# Patient Record
Sex: MALE | Race: WHITE | NOT HISPANIC OR LATINO
[De-identification: names, ages, dates, MRNs, and addresses within clinical notes are randomized per-mention and may not be internally consistent; named-entity substitution may affect disease eponyms.]

---

## 2023-04-14 PROBLEM — Z00.00 ENCOUNTER FOR PREVENTIVE HEALTH EXAMINATION: Status: ACTIVE | Noted: 2023-04-14

## 2023-04-17 ENCOUNTER — APPOINTMENT (OUTPATIENT)
Dept: VASCULAR SURGERY | Facility: CLINIC | Age: 78
End: 2023-04-17
Payer: MEDICAID

## 2023-04-17 VITALS — HEIGHT: 66 IN

## 2023-04-17 VITALS — WEIGHT: 169 LBS | BODY MASS INDEX: 27.28 KG/M2

## 2023-04-17 VITALS — DIASTOLIC BLOOD PRESSURE: 82 MMHG | SYSTOLIC BLOOD PRESSURE: 136 MMHG

## 2023-04-17 DIAGNOSIS — Z85.46 PERSONAL HISTORY OF MALIGNANT NEOPLASM OF PROSTATE: ICD-10-CM

## 2023-04-17 DIAGNOSIS — Z86.39 PERSONAL HISTORY OF OTHER ENDOCRINE, NUTRITIONAL AND METABOLIC DISEASE: ICD-10-CM

## 2023-04-17 PROCEDURE — 93880 EXTRACRANIAL BILAT STUDY: CPT

## 2023-04-17 PROCEDURE — 99204 OFFICE O/P NEW MOD 45 MIN: CPT

## 2023-04-17 NOTE — END OF VISIT
[FreeTextEntry3] : 77 M with asymptomatic severe L ICA stenosis, with ratio of 8. We will get the CD to review the images.\par I explained to his son and him about natural history of severe ICA stenosis and the surgical options. They expressed understanding about risk of perioperative stroke and cranial nerve injury. They want to proceed with L CEA.

## 2023-04-17 NOTE — ASSESSMENT
[FreeTextEntry1] : The patient is a 77-year-old male with asymptomatic carotid stenosis.  The patient has a high-grade left internal carotid artery stenosis in which I have discussed the risk  of a carotid endarterectomy including stroke and bleeding.  The patient is in agreement and he will be scheduled for a left carotid endarterectomy.  The patient presents with his son.  The patient will require cardiac medical clearance prior to the procedure.  He is scheduled for a left carotid endarterectomy on 5/5/23

## 2023-04-17 NOTE — HISTORY OF PRESENT ILLNESS
[FreeTextEntry1] : The patient is a 77-year-old gentleman who presented to his primary care physician complaining of some dizziness and lightheadedness.  The patient was sent for a carotid duplex that noted to have a high-grade left internal carotid artery stenosis.  The patient was then sent for a CTA a scan at [facility]ology services in [place] that showed a left ICA stenosis.  The patient denies amaurosis fugax or specific TIA-like symptoms.  He has a past medical history of hyperlipidemia and prostate cancer.  He is currently on a statin and aspirin regiment daily.

## 2023-04-17 NOTE — DATA REVIEWED
[FreeTextEntry1] : Performed a carotid duplex that shows a right internal carotid artery less than 50% stenosis in the left internal carotid artery greater than 70% stenosis with an ICA/CCA ratio of 8.1.

## 2023-04-24 ENCOUNTER — OUTPATIENT (OUTPATIENT)
Dept: OUTPATIENT SERVICES | Facility: HOSPITAL | Age: 78
LOS: 1 days | End: 2023-04-24
Payer: MEDICAID

## 2023-04-24 ENCOUNTER — RESULT REVIEW (OUTPATIENT)
Age: 78
End: 2023-04-24

## 2023-04-24 VITALS
HEIGHT: 66 IN | DIASTOLIC BLOOD PRESSURE: 84 MMHG | HEART RATE: 72 BPM | WEIGHT: 171.96 LBS | OXYGEN SATURATION: 97 % | SYSTOLIC BLOOD PRESSURE: 160 MMHG | RESPIRATION RATE: 16 BRPM | TEMPERATURE: 98 F

## 2023-04-24 DIAGNOSIS — Z01.818 ENCOUNTER FOR OTHER PREPROCEDURAL EXAMINATION: ICD-10-CM

## 2023-04-24 DIAGNOSIS — Z90.79 ACQUIRED ABSENCE OF OTHER GENITAL ORGAN(S): Chronic | ICD-10-CM

## 2023-04-24 DIAGNOSIS — I65.23 OCCLUSION AND STENOSIS OF BILATERAL CAROTID ARTERIES: ICD-10-CM

## 2023-04-24 LAB
ALBUMIN SERPL ELPH-MCNC: 4.4 G/DL — SIGNIFICANT CHANGE UP (ref 3.5–5.2)
ALP SERPL-CCNC: 82 U/L — SIGNIFICANT CHANGE UP (ref 30–115)
ALT FLD-CCNC: 12 U/L — SIGNIFICANT CHANGE UP (ref 0–41)
ANION GAP SERPL CALC-SCNC: 9 MMOL/L — SIGNIFICANT CHANGE UP (ref 7–14)
APTT BLD: 30.4 SEC — SIGNIFICANT CHANGE UP (ref 27–39.2)
AST SERPL-CCNC: 15 U/L — SIGNIFICANT CHANGE UP (ref 0–41)
BASOPHILS # BLD AUTO: 0.02 K/UL — SIGNIFICANT CHANGE UP (ref 0–0.2)
BASOPHILS NFR BLD AUTO: 0.6 % — SIGNIFICANT CHANGE UP (ref 0–1)
BILIRUB SERPL-MCNC: 0.5 MG/DL — SIGNIFICANT CHANGE UP (ref 0.2–1.2)
BLD GP AB SCN SERPL QL: SIGNIFICANT CHANGE UP
BUN SERPL-MCNC: 22 MG/DL — HIGH (ref 10–20)
CALCIUM SERPL-MCNC: 9.6 MG/DL — SIGNIFICANT CHANGE UP (ref 8.4–10.5)
CHLORIDE SERPL-SCNC: 107 MMOL/L — SIGNIFICANT CHANGE UP (ref 98–110)
CO2 SERPL-SCNC: 25 MMOL/L — SIGNIFICANT CHANGE UP (ref 17–32)
CREAT SERPL-MCNC: 1.1 MG/DL — SIGNIFICANT CHANGE UP (ref 0.7–1.5)
EGFR: 69 ML/MIN/1.73M2 — SIGNIFICANT CHANGE UP
EOSINOPHIL # BLD AUTO: 0.16 K/UL — SIGNIFICANT CHANGE UP (ref 0–0.7)
EOSINOPHIL NFR BLD AUTO: 4.8 % — SIGNIFICANT CHANGE UP (ref 0–8)
GLUCOSE SERPL-MCNC: 80 MG/DL — SIGNIFICANT CHANGE UP (ref 70–99)
HCT VFR BLD CALC: 40.4 % — LOW (ref 42–52)
HGB BLD-MCNC: 12.8 G/DL — LOW (ref 14–18)
IMM GRANULOCYTES NFR BLD AUTO: 0 % — LOW (ref 0.1–0.3)
INR BLD: 0.95 RATIO — SIGNIFICANT CHANGE UP (ref 0.65–1.3)
LYMPHOCYTES # BLD AUTO: 0.59 K/UL — LOW (ref 1.2–3.4)
LYMPHOCYTES # BLD AUTO: 17.8 % — LOW (ref 20.5–51.1)
MCHC RBC-ENTMCNC: 27.2 PG — SIGNIFICANT CHANGE UP (ref 27–31)
MCHC RBC-ENTMCNC: 31.7 G/DL — LOW (ref 32–37)
MCV RBC AUTO: 86 FL — SIGNIFICANT CHANGE UP (ref 80–94)
MONOCYTES # BLD AUTO: 0.24 K/UL — SIGNIFICANT CHANGE UP (ref 0.1–0.6)
MONOCYTES NFR BLD AUTO: 7.2 % — SIGNIFICANT CHANGE UP (ref 1.7–9.3)
NEUTROPHILS # BLD AUTO: 2.31 K/UL — SIGNIFICANT CHANGE UP (ref 1.4–6.5)
NEUTROPHILS NFR BLD AUTO: 69.6 % — SIGNIFICANT CHANGE UP (ref 42.2–75.2)
NRBC # BLD: 0 /100 WBCS — SIGNIFICANT CHANGE UP (ref 0–0)
PLATELET # BLD AUTO: 169 K/UL — SIGNIFICANT CHANGE UP (ref 130–400)
PMV BLD: 9.6 FL — SIGNIFICANT CHANGE UP (ref 7.4–10.4)
POTASSIUM SERPL-MCNC: 4.4 MMOL/L — SIGNIFICANT CHANGE UP (ref 3.5–5)
POTASSIUM SERPL-SCNC: 4.4 MMOL/L — SIGNIFICANT CHANGE UP (ref 3.5–5)
PROT SERPL-MCNC: 6.9 G/DL — SIGNIFICANT CHANGE UP (ref 6–8)
PROTHROM AB SERPL-ACNC: 10.8 SEC — SIGNIFICANT CHANGE UP (ref 9.95–12.87)
RBC # BLD: 4.7 M/UL — SIGNIFICANT CHANGE UP (ref 4.7–6.1)
RBC # FLD: 13.5 % — SIGNIFICANT CHANGE UP (ref 11.5–14.5)
SODIUM SERPL-SCNC: 141 MMOL/L — SIGNIFICANT CHANGE UP (ref 135–146)
WBC # BLD: 3.23 K/UL — LOW (ref 4.8–10.8)
WBC # FLD AUTO: 3.23 K/UL — LOW (ref 4.8–10.8)

## 2023-04-24 PROCEDURE — 93010 ELECTROCARDIOGRAM REPORT: CPT

## 2023-04-24 PROCEDURE — 71046 X-RAY EXAM CHEST 2 VIEWS: CPT

## 2023-04-24 PROCEDURE — 85730 THROMBOPLASTIN TIME PARTIAL: CPT

## 2023-04-24 PROCEDURE — 85610 PROTHROMBIN TIME: CPT

## 2023-04-24 PROCEDURE — 86900 BLOOD TYPING SEROLOGIC ABO: CPT

## 2023-04-24 PROCEDURE — 36415 COLL VENOUS BLD VENIPUNCTURE: CPT

## 2023-04-24 PROCEDURE — 93005 ELECTROCARDIOGRAM TRACING: CPT

## 2023-04-24 PROCEDURE — 86901 BLOOD TYPING SEROLOGIC RH(D): CPT

## 2023-04-24 PROCEDURE — 99214 OFFICE O/P EST MOD 30 MIN: CPT | Mod: 25

## 2023-04-24 PROCEDURE — 71046 X-RAY EXAM CHEST 2 VIEWS: CPT | Mod: 26

## 2023-04-24 PROCEDURE — 85025 COMPLETE CBC W/AUTO DIFF WBC: CPT

## 2023-04-24 PROCEDURE — 86850 RBC ANTIBODY SCREEN: CPT

## 2023-04-24 PROCEDURE — 80053 COMPREHEN METABOLIC PANEL: CPT

## 2023-04-24 NOTE — H&P PST ADULT - NSICDXPASTMEDICALHX_GEN_ALL_CORE_FT
PAST MEDICAL HISTORY:  Former smoker     H/O carotid stenosis     HTN (hypertension)     Prostate cancer     PVD (peripheral vascular disease)

## 2023-04-24 NOTE — H&P PST ADULT - ATTENDING COMMENTS
Patient is here for left CEA w EEG monitoring. Marked and consented in preop holding with use of Fijian  and son (Janak)  No change, TIA or stroke since office visit.

## 2023-04-24 NOTE — H&P PST ADULT - HISTORY OF PRESENT ILLNESS
Patient is a 77  year old  male presenting to [facility] in preparation for LEFT CAROTID ENDARTERECTOMY W/ PATCH & EEG MONITORING  on 5/5/23  under anesthesia by Dr. Rubio .  per son reports c/o ~ 2 yrs impaired short term memory& loss of balance/ dizziness when walking, "there is a blockage in the left side of his neck"    PATIENT CURRENTLY DENIES CHEST PAIN  SHORTNESS OF BREATH  PALPITATIONS,  DYSURIA, OR UPPER RESPIRATORY INFECTION IN PAST 2 WEEKS  EXERCISE  TOLERANCE 2 blocks  WITHOUT SHORTNESS OF BREATH  denies travel outside the [place] in the past 30 days  Patient denies any signs or symptoms of COVID 19 and denies contact with known positive individuals.  They have an appointment for repeat COVID testing pre-procedure and acknowledge its time and place.  They were instructed to quarantine pre-procedure, practice exposure control measures, continue to self-monitor and report any concerns to their proceduralist.  pt advised self quarantine till day of procedure  Anesthesia Alert  NO--Difficult Airway  NO--History of neck surgery or radiation  NO--Limited ROM of neck  NO--History of Malignant hyperthermia  NO--No personal or family history of Pseudocholinesterase deficiency.  NO--Prior Anesthesia Complication  NO--Latex Allergy  NO--Loose teeth  NO--History of Rheumatoid Arthritis  NO--Bleeding risk  NO--RASHAAD  NO--Other_____    PT DENIES ANY RASHES, ABRASION, OR OPEN WOUNDS OR CUTS    AS PER THE PT, THIS IS HIS/HER COMPLETE MEDICAL AND SURGICAL HX, INCLUDING MEDICATIONS PRESCRIBED AND OVER THE COUNTER    Patient verbalized understanding of instructions and was given the opportunity to ask questions and have them answered.    pt denies any suicidal ideation or thoughts, pt states not a threat to self or others

## 2023-04-25 DIAGNOSIS — Z01.818 ENCOUNTER FOR OTHER PREPROCEDURAL EXAMINATION: ICD-10-CM

## 2023-04-25 DIAGNOSIS — I65.23 OCCLUSION AND STENOSIS OF BILATERAL CAROTID ARTERIES: ICD-10-CM

## 2023-05-05 ENCOUNTER — RESULT REVIEW (OUTPATIENT)
Age: 78
End: 2023-05-05

## 2023-05-05 ENCOUNTER — APPOINTMENT (OUTPATIENT)
Dept: VASCULAR SURGERY | Facility: HOSPITAL | Age: 78
End: 2023-05-05

## 2023-05-05 ENCOUNTER — TRANSCRIPTION ENCOUNTER (OUTPATIENT)
Age: 78
End: 2023-05-05

## 2023-05-05 ENCOUNTER — INPATIENT (INPATIENT)
Facility: HOSPITAL | Age: 78
LOS: 0 days | Discharge: HOME | DRG: 24 | End: 2023-05-06
Attending: STUDENT IN AN ORGANIZED HEALTH CARE EDUCATION/TRAINING PROGRAM | Admitting: STUDENT IN AN ORGANIZED HEALTH CARE EDUCATION/TRAINING PROGRAM
Payer: MEDICAID

## 2023-05-05 VITALS
DIASTOLIC BLOOD PRESSURE: 84 MMHG | RESPIRATION RATE: 17 BRPM | WEIGHT: 171.96 LBS | OXYGEN SATURATION: 99 % | HEART RATE: 71 BPM | SYSTOLIC BLOOD PRESSURE: 172 MMHG | HEIGHT: 66 IN | TEMPERATURE: 98 F

## 2023-05-05 DIAGNOSIS — I65.23 OCCLUSION AND STENOSIS OF BILATERAL CAROTID ARTERIES: ICD-10-CM

## 2023-05-05 DIAGNOSIS — Z90.79 ACQUIRED ABSENCE OF OTHER GENITAL ORGAN(S): Chronic | ICD-10-CM

## 2023-05-05 LAB
ANION GAP SERPL CALC-SCNC: 10 MMOL/L — SIGNIFICANT CHANGE UP (ref 7–14)
ANION GAP SERPL CALC-SCNC: 11 MMOL/L — SIGNIFICANT CHANGE UP (ref 7–14)
BASOPHILS # BLD AUTO: 0.02 K/UL — SIGNIFICANT CHANGE UP (ref 0–0.2)
BASOPHILS NFR BLD AUTO: 0.4 % — SIGNIFICANT CHANGE UP (ref 0–1)
BUN SERPL-MCNC: 21 MG/DL — HIGH (ref 10–20)
BUN SERPL-MCNC: 28 MG/DL — HIGH (ref 10–20)
CALCIUM SERPL-MCNC: 8.4 MG/DL — SIGNIFICANT CHANGE UP (ref 8.4–10.5)
CALCIUM SERPL-MCNC: 9 MG/DL — SIGNIFICANT CHANGE UP (ref 8.4–10.4)
CHLORIDE SERPL-SCNC: 107 MMOL/L — SIGNIFICANT CHANGE UP (ref 98–110)
CHLORIDE SERPL-SCNC: 108 MMOL/L — SIGNIFICANT CHANGE UP (ref 98–110)
CK MB CFR SERPL CALC: 1.6 NG/ML — SIGNIFICANT CHANGE UP (ref 0.6–6.3)
CK SERPL-CCNC: 59 U/L — SIGNIFICANT CHANGE UP (ref 0–225)
CO2 SERPL-SCNC: 19 MMOL/L — SIGNIFICANT CHANGE UP (ref 17–32)
CO2 SERPL-SCNC: 20 MMOL/L — SIGNIFICANT CHANGE UP (ref 17–32)
CREAT SERPL-MCNC: 0.9 MG/DL — SIGNIFICANT CHANGE UP (ref 0.7–1.5)
CREAT SERPL-MCNC: 1.1 MG/DL — SIGNIFICANT CHANGE UP (ref 0.7–1.5)
EGFR: 69 ML/MIN/1.73M2 — SIGNIFICANT CHANGE UP
EGFR: 87 ML/MIN/1.73M2 — SIGNIFICANT CHANGE UP
EOSINOPHIL # BLD AUTO: 0.09 K/UL — SIGNIFICANT CHANGE UP (ref 0–0.7)
EOSINOPHIL NFR BLD AUTO: 1.8 % — SIGNIFICANT CHANGE UP (ref 0–8)
GLUCOSE SERPL-MCNC: 113 MG/DL — HIGH (ref 70–99)
GLUCOSE SERPL-MCNC: 118 MG/DL — HIGH (ref 70–99)
HCT VFR BLD CALC: 34.3 % — LOW (ref 42–52)
HCT VFR BLD CALC: 37.1 % — LOW (ref 42–52)
HGB BLD-MCNC: 11.4 G/DL — LOW (ref 14–18)
HGB BLD-MCNC: 12.2 G/DL — LOW (ref 14–18)
IMM GRANULOCYTES NFR BLD AUTO: 0.2 % — SIGNIFICANT CHANGE UP (ref 0.1–0.3)
LACTATE SERPL-SCNC: 0.7 MMOL/L — SIGNIFICANT CHANGE UP (ref 0.7–2)
LYMPHOCYTES # BLD AUTO: 0.63 K/UL — LOW (ref 1.2–3.4)
LYMPHOCYTES # BLD AUTO: 12.9 % — LOW (ref 20.5–51.1)
MAGNESIUM SERPL-MCNC: 1.7 MG/DL — LOW (ref 1.8–2.4)
MAGNESIUM SERPL-MCNC: 2 MG/DL — SIGNIFICANT CHANGE UP (ref 1.8–2.4)
MCHC RBC-ENTMCNC: 27.8 PG — SIGNIFICANT CHANGE UP (ref 27–31)
MCHC RBC-ENTMCNC: 28.4 PG — SIGNIFICANT CHANGE UP (ref 27–31)
MCHC RBC-ENTMCNC: 32.9 G/DL — SIGNIFICANT CHANGE UP (ref 32–37)
MCHC RBC-ENTMCNC: 33.2 G/DL — SIGNIFICANT CHANGE UP (ref 32–37)
MCV RBC AUTO: 84.5 FL — SIGNIFICANT CHANGE UP (ref 80–94)
MCV RBC AUTO: 85.3 FL — SIGNIFICANT CHANGE UP (ref 80–94)
MONOCYTES # BLD AUTO: 0.33 K/UL — SIGNIFICANT CHANGE UP (ref 0.1–0.6)
MONOCYTES NFR BLD AUTO: 6.8 % — SIGNIFICANT CHANGE UP (ref 1.7–9.3)
NEUTROPHILS # BLD AUTO: 3.8 K/UL — SIGNIFICANT CHANGE UP (ref 1.4–6.5)
NEUTROPHILS NFR BLD AUTO: 77.9 % — HIGH (ref 42.2–75.2)
NRBC # BLD: 0 /100 WBCS — SIGNIFICANT CHANGE UP (ref 0–0)
NRBC # BLD: 0 /100 WBCS — SIGNIFICANT CHANGE UP (ref 0–0)
PHOSPHATE SERPL-MCNC: 2.9 MG/DL — SIGNIFICANT CHANGE UP (ref 2.1–4.9)
PHOSPHATE SERPL-MCNC: 3.6 MG/DL — SIGNIFICANT CHANGE UP (ref 2.1–4.9)
PLATELET # BLD AUTO: 166 K/UL — SIGNIFICANT CHANGE UP (ref 130–400)
PLATELET # BLD AUTO: 167 K/UL — SIGNIFICANT CHANGE UP (ref 130–400)
PMV BLD: 9.1 FL — SIGNIFICANT CHANGE UP (ref 7.4–10.4)
PMV BLD: 9.4 FL — SIGNIFICANT CHANGE UP (ref 7.4–10.4)
POTASSIUM SERPL-MCNC: 4.2 MMOL/L — SIGNIFICANT CHANGE UP (ref 3.5–5)
POTASSIUM SERPL-MCNC: 4.3 MMOL/L — SIGNIFICANT CHANGE UP (ref 3.5–5)
POTASSIUM SERPL-SCNC: 4.2 MMOL/L — SIGNIFICANT CHANGE UP (ref 3.5–5)
POTASSIUM SERPL-SCNC: 4.3 MMOL/L — SIGNIFICANT CHANGE UP (ref 3.5–5)
RBC # BLD: 4.02 M/UL — LOW (ref 4.7–6.1)
RBC # BLD: 4.39 M/UL — LOW (ref 4.7–6.1)
RBC # FLD: 13.3 % — SIGNIFICANT CHANGE UP (ref 11.5–14.5)
RBC # FLD: 13.3 % — SIGNIFICANT CHANGE UP (ref 11.5–14.5)
SODIUM SERPL-SCNC: 137 MMOL/L — SIGNIFICANT CHANGE UP (ref 135–146)
SODIUM SERPL-SCNC: 138 MMOL/L — SIGNIFICANT CHANGE UP (ref 135–146)
TROPONIN T SERPL-MCNC: <0.01 NG/ML — SIGNIFICANT CHANGE UP
WBC # BLD: 4.68 K/UL — LOW (ref 4.8–10.8)
WBC # BLD: 4.88 K/UL — SIGNIFICANT CHANGE UP (ref 4.8–10.8)
WBC # FLD AUTO: 4.68 K/UL — LOW (ref 4.8–10.8)
WBC # FLD AUTO: 4.88 K/UL — SIGNIFICANT CHANGE UP (ref 4.8–10.8)

## 2023-05-05 PROCEDURE — 82553 CREATINE MB FRACTION: CPT

## 2023-05-05 PROCEDURE — 93010 ELECTROCARDIOGRAM REPORT: CPT

## 2023-05-05 PROCEDURE — 71045 X-RAY EXAM CHEST 1 VIEW: CPT

## 2023-05-05 PROCEDURE — 85027 COMPLETE CBC AUTOMATED: CPT

## 2023-05-05 PROCEDURE — 71045 X-RAY EXAM CHEST 1 VIEW: CPT | Mod: 26

## 2023-05-05 PROCEDURE — 84484 ASSAY OF TROPONIN QUANT: CPT

## 2023-05-05 PROCEDURE — 80048 BASIC METABOLIC PNL TOTAL CA: CPT

## 2023-05-05 PROCEDURE — 84100 ASSAY OF PHOSPHORUS: CPT

## 2023-05-05 PROCEDURE — 83605 ASSAY OF LACTIC ACID: CPT

## 2023-05-05 PROCEDURE — 82550 ASSAY OF CK (CPK): CPT

## 2023-05-05 PROCEDURE — 88304 TISSUE EXAM BY PATHOLOGIST: CPT | Mod: 26

## 2023-05-05 PROCEDURE — 83735 ASSAY OF MAGNESIUM: CPT

## 2023-05-05 PROCEDURE — 85025 COMPLETE CBC W/AUTO DIFF WBC: CPT

## 2023-05-05 PROCEDURE — 93005 ELECTROCARDIOGRAM TRACING: CPT

## 2023-05-05 PROCEDURE — C1768: CPT

## 2023-05-05 PROCEDURE — C1751: CPT

## 2023-05-05 PROCEDURE — 88304 TISSUE EXAM BY PATHOLOGIST: CPT

## 2023-05-05 PROCEDURE — 36415 COLL VENOUS BLD VENIPUNCTURE: CPT

## 2023-05-05 PROCEDURE — 35301 RECHANNELING OF ARTERY: CPT | Mod: GC

## 2023-05-05 PROCEDURE — C1889: CPT

## 2023-05-05 RX ORDER — NICARDIPINE HYDROCHLORIDE 30 MG/1
1 CAPSULE, EXTENDED RELEASE ORAL
Qty: 40 | Refills: 0 | Status: DISCONTINUED | OUTPATIENT
Start: 2023-05-05 | End: 2023-05-06

## 2023-05-05 RX ORDER — MEPERIDINE HYDROCHLORIDE 50 MG/ML
12.5 INJECTION INTRAMUSCULAR; INTRAVENOUS; SUBCUTANEOUS
Refills: 0 | Status: DISCONTINUED | OUTPATIENT
Start: 2023-05-05 | End: 2023-05-05

## 2023-05-05 RX ORDER — HYDRALAZINE HCL 50 MG
5 TABLET ORAL EVERY 6 HOURS
Refills: 0 | Status: DISCONTINUED | OUTPATIENT
Start: 2023-05-05 | End: 2023-05-05

## 2023-05-05 RX ORDER — MAGNESIUM SULFATE 500 MG/ML
1 VIAL (ML) INJECTION ONCE
Refills: 0 | Status: COMPLETED | OUTPATIENT
Start: 2023-05-05 | End: 2023-05-05

## 2023-05-05 RX ORDER — CHLORHEXIDINE GLUCONATE 213 G/1000ML
1 SOLUTION TOPICAL
Refills: 0 | Status: DISCONTINUED | OUTPATIENT
Start: 2023-05-05 | End: 2023-05-06

## 2023-05-05 RX ORDER — ASPIRIN/CALCIUM CARB/MAGNESIUM 324 MG
0 TABLET ORAL
Refills: 0 | DISCHARGE

## 2023-05-05 RX ORDER — ATORVASTATIN CALCIUM 80 MG/1
20 TABLET, FILM COATED ORAL AT BEDTIME
Refills: 0 | Status: DISCONTINUED | OUTPATIENT
Start: 2023-05-05 | End: 2023-05-06

## 2023-05-05 RX ORDER — MORPHINE SULFATE 50 MG/1
4 CAPSULE, EXTENDED RELEASE ORAL
Refills: 0 | Status: DISCONTINUED | OUTPATIENT
Start: 2023-05-05 | End: 2023-05-05

## 2023-05-05 RX ORDER — POLYETHYLENE GLYCOL 3350 17 G/17G
17 POWDER, FOR SOLUTION ORAL DAILY
Refills: 0 | Status: DISCONTINUED | OUTPATIENT
Start: 2023-05-05 | End: 2023-05-06

## 2023-05-05 RX ORDER — ROSUVASTATIN CALCIUM 5 MG/1
1 TABLET ORAL
Refills: 0 | DISCHARGE

## 2023-05-05 RX ORDER — SODIUM CHLORIDE 9 MG/ML
1000 INJECTION, SOLUTION INTRAVENOUS
Refills: 0 | Status: DISCONTINUED | OUTPATIENT
Start: 2023-05-05 | End: 2023-05-05

## 2023-05-05 RX ORDER — ONDANSETRON 8 MG/1
4 TABLET, FILM COATED ORAL ONCE
Refills: 0 | Status: DISCONTINUED | OUTPATIENT
Start: 2023-05-05 | End: 2023-05-05

## 2023-05-05 RX ORDER — MORPHINE SULFATE 50 MG/1
2 CAPSULE, EXTENDED RELEASE ORAL
Refills: 0 | Status: DISCONTINUED | OUTPATIENT
Start: 2023-05-05 | End: 2023-05-05

## 2023-05-05 RX ORDER — CHOLECALCIFEROL (VITAMIN D3) 125 MCG
1 CAPSULE ORAL
Refills: 0 | DISCHARGE

## 2023-05-05 RX ORDER — CEFAZOLIN SODIUM 1 G
2000 VIAL (EA) INJECTION EVERY 8 HOURS
Refills: 0 | Status: COMPLETED | OUTPATIENT
Start: 2023-05-05 | End: 2023-05-05

## 2023-05-05 RX ORDER — SENNA PLUS 8.6 MG/1
1 TABLET ORAL AT BEDTIME
Refills: 0 | Status: DISCONTINUED | OUTPATIENT
Start: 2023-05-05 | End: 2023-05-06

## 2023-05-05 RX ORDER — GABAPENTIN 400 MG/1
100 CAPSULE ORAL THREE TIMES A DAY
Refills: 0 | Status: DISCONTINUED | OUTPATIENT
Start: 2023-05-05 | End: 2023-05-06

## 2023-05-05 RX ORDER — ACETAMINOPHEN 500 MG
650 TABLET ORAL EVERY 6 HOURS
Refills: 0 | Status: DISCONTINUED | OUTPATIENT
Start: 2023-05-05 | End: 2023-05-06

## 2023-05-05 RX ORDER — PREGABALIN 225 MG/1
0 CAPSULE ORAL
Refills: 0 | DISCHARGE

## 2023-05-05 RX ADMIN — NICARDIPINE HYDROCHLORIDE 5 MG/HR: 30 CAPSULE, EXTENDED RELEASE ORAL at 17:46

## 2023-05-05 RX ADMIN — GABAPENTIN 100 MILLIGRAM(S): 400 CAPSULE ORAL at 13:53

## 2023-05-05 RX ADMIN — SENNA PLUS 1 TABLET(S): 8.6 TABLET ORAL at 21:50

## 2023-05-05 RX ADMIN — GABAPENTIN 100 MILLIGRAM(S): 400 CAPSULE ORAL at 21:50

## 2023-05-05 RX ADMIN — POLYETHYLENE GLYCOL 3350 17 GRAM(S): 17 POWDER, FOR SOLUTION ORAL at 21:50

## 2023-05-05 RX ADMIN — Medication 650 MILLIGRAM(S): at 23:20

## 2023-05-05 RX ADMIN — Medication 100 MILLIGRAM(S): at 16:35

## 2023-05-05 RX ADMIN — ATORVASTATIN CALCIUM 20 MILLIGRAM(S): 80 TABLET, FILM COATED ORAL at 21:50

## 2023-05-05 RX ADMIN — Medication 100 MILLIGRAM(S): at 21:50

## 2023-05-05 RX ADMIN — Medication 100 GRAM(S): at 13:52

## 2023-05-05 RX ADMIN — Medication 650 MILLIGRAM(S): at 17:46

## 2023-05-05 RX ADMIN — Medication 650 MILLIGRAM(S): at 18:51

## 2023-05-05 NOTE — DISCHARGE NOTE PROVIDER - HOSPITAL COURSE
77M presented for elective left CEA for high grade left carotid stenosis. Pt was seen in office for c/o ~ 2 yrs impaired short term memory& loss of balance/ dizziness when walking, "there is a blockage in the left side of his neck". 77y Male with a PMH of HTN, prostate CA s/p prostatectomy, PVD who presented to [facility] for left carotid endarterectomy. The patient is a poor historian, however according to preoperative records the patient had reportedly c/o 2 years of progressive symptoms of short term memory loss, loss balance, dizziness while walking.   The patient is now s/p Left Carotid Endarterectomy, uncomplicated intraoperative course however elevated BP in the OR requiring esmolol gtt, nitroglycerin push, and Labetalol push 20mg last at 10:30AM.   SICU consulted for hemodynamic monitoring and blood pressure control.  The patient has been weaned off of the nicardipine gtt in the SICU,  weaned off of NC, tolerating regular diet, OOB and ambulatory and hemodynamically stable. The patient is clear for discharge from a Vascular Surgery standpoint with home ASA and Plavix. Dressings may be removed tomorrow, steri strips to fall off on their own. The patient may follow up in [facility] for furhther mendical management with Dr. Wendi Best.

## 2023-05-05 NOTE — CONSULT NOTE ADULT - CRITICAL CARE ATTENDING COMMENT
Critical Care: [id number]   This patient has a high probability of sudden, clinically significant deterioration, which requires the highest level of physician preparedness to intervene urgently. I managed/supervised life or organ supporting interventions that required frequent physician assessment. I have reviewed and agree with note above. I devoted my full attention to the direct care of this patient for the period of time indicated, including reviewing relevant labs and imaging, discussing treatment plan with the SICU team, nurses, and primary team at the time of multidisciplinary rounds, and reviewing findings with patient and family. This does not include time devoted to teaching any trainees and to performing any procedures.    GONSALO SMITH 77y Male admitted to SICU s/p L CEA     Issues we are addressing today:    vascular checks per surgery  multimodal pain meds  optimize fluid status, home antihtn meds  wean O2 as possible  diet advance as tolerated  monitor Is &Os  periop abx  a-line for now  DVT Prophylaxis   Stress Gastritis Prophylaxis   PT/OT when safe    The above note is NOT written at the time of rounds and will reflect all changes throughout management of the patient for the day note is written for.    Olvin Santo MD, D.SARAH

## 2023-05-05 NOTE — CONSULT NOTE ADULT - SUBJECTIVE AND OBJECTIVE BOX
SICU Consultation Note  =====================================================  HPI: 77y Male with a PMH of HTN, prostate CA s/p prostatectomy, PVD who presented to [facility] for left carotid endarterectomy. The patient is a poor historian, however according to preoperative records the patient had reportedly c/o 2 years of progressive symptoms of short term memory loss, loss balance, dizziness while walking.     The patient is now s/p Left Carotid Endarterectomy, uncomplicated intraoperative course however elevated BP in the OR requiring esmolol gtt, nitroglycerin push, and Labetalol push 20mg last at 10:30AM.     SICU consulted for hemodynamic monitoring and blood pressure control.     Surgery Information  OR time:  2 hours 45 mins   EBL:    50cc      IV Fluids: 1.3L      Blood Products: None   UOP:          PAST MEDICAL & SURGICAL HISTORY:  Prostate cancer      PVD (peripheral vascular disease)      Former smoker      H/O carotid stenosis      HTN (hypertension)      H/O prostatectomy        Home Meds: Home Medications:  aspirin 81 mg oral tablet: orally once a day (05 May 2023 06:49)  Crestor 5 mg oral tablet: 1 orally once a day (05 May 2023 06:49)  D3-50 1250 mcg (50,000 intl units) oral capsule: 1 orally once a week (05 May 2023 06:49)    Allergies: Allergies    No Known Drug Allergies  bee stings (Short breath)    Intolerances      Soc:   Advanced Directives: Presumed Full Code     ROS:    REVIEW OF SYSTEMS    [ ] A ten-point review of systems was otherwise negative except as noted.  [ ] Due to altered mental status/intubation, subjective information were not able to be obtained from the patient. History was obtained, to the extent possible, from review of the chart and collateral sources of information.      CURRENT MEDICATIONS:   --------------------------------------------------------------------------------------  Neurologic Medications  meperidine     Injectable 12.5 milliGRAM(s) IV Push every 10 minutes PRN Shivering  morphine  - Injectable 4 milliGRAM(s) IV Push every 10 minutes PRN Severe Pain (7 - 10)  morphine  - Injectable 2 milliGRAM(s) IV Push every 10 minutes PRN Moderate Pain (4 - 6)  ondansetron Injectable 4 milliGRAM(s) IV Push once PRN Nausea and/or Vomiting    Respiratory Medications    Cardiovascular Medications    Gastrointestinal Medications  lactated ringers. 1000 milliLiter(s) IV Continuous <Continuous>    Genitourinary Medications    Hematologic/Oncologic Medications    Antimicrobial/Immunologic Medications  ceFAZolin   IVPB 2000 milliGRAM(s) IV Intermittent every 8 hours    Endocrine/Metabolic Medications  atorvastatin 20 milliGRAM(s) Oral at bedtime    Topical/Other Medications  chlorhexidine 4% Liquid 1 Application(s) Topical <User Schedule>    --------------------------------------------------------------------------------------    VITAL SIGNS, INS/OUTS (last 24 hours):  --------------------------------------------------------------------------------------  ICU Vital Signs Last 24 Hrs  T(C): 36.3 (05 May 2023 11:40), Max: 36.6 (05 May 2023 06:38)  T(F): 97.4 (05 May 2023 11:40), Max: 97.8 (05 May 2023 06:38)  HR: 64 (05 May 2023 12:25) (63 - 71)  BP: 143/67 (05 May 2023 12:25) (130/64 - 172/84)  BP(mean): --  ABP: 150/52 (05 May 2023 12:25) (130/66 - 152/56)  ABP(mean): --  RR: 19 (05 May 2023 12:25) (17 - 19)  SpO2: 98% (05 May 2023 12:25) (97% - 99%)    O2 Parameters below as of 05 May 2023 12:25  Patient On (Oxygen Delivery Method): nasal cannula  O2 Flow (L/min): 3        I&O's Summary    --------------------------------------------------------------------------------------    EXAM:  General/Neuro  Exam: Normal, NAD, alert, oriented x 3, no focal deficits. PERRLA      Respiratory  Exam: Lungs clear to auscultation, Normal expansion/effort, satting well on 3L NC    Cardiovascular  Exam: S1, S2.  Regular rate and rhythm.    GI  Exam: Abdomen soft, Non-tender, Non-distended. No guarding, rebound or rigidity.   Current Diet: CLD      Tubes/Lines/Drains  ***  [x] Peripheral IV  [x] Arterial Line		[] R	[x] L	[] Fem	[x] Rad	[] Ax	Date Placed: 5/5/23      Extremities  Exam: Extremities warm, pink, well-perfused.    Derm:  Exam: Good skin turgor, no skin breakdown.        LABS  --------------------------------------------------------------------------------------  Labs:  CAPILLARY BLOOD GLUCOSE                              11.4   4.88  )-----------( 167      ( 05 May 2023 12:20 )             34.3       Auto Neutrophil %: 77.9 % (05-05-23 @ 12:20)  Auto Immature Granulocyte %: 0.2 % (05-05-23 @ 12:20)        --------------------------------------------------------------------------------------      IMAGING RESULTS    < from: Xray Chest 2 Views PA/Lat (04.24.23 @ 09:52) >    No radiographic evidence of acute cardiopulmonary disease.    Findings suggest COPD.    < end of copied text >   SICU Consultation Note  =====================================================  HPI: 77y Male with a PMH of HTN, prostate CA s/p prostatectomy, PVD who presented to [facility] for left carotid endarterectomy. The patient is a poor historian, however according to preoperative records the patient had reportedly c/o 2 years of progressive symptoms of short term memory loss, loss balance, dizziness while walking.     The patient is now s/p Left Carotid Endarterectomy, uncomplicated intraoperative course however elevated BP in the OR requiring esmolol gtt, nitroglycerin push, and Labetalol push 20mg last at 10:30AM.     SICU consulted for hemodynamic monitoring and blood pressure control.     Surgery Information  OR time:  2 hours 45 mins   EBL:    50cc      IV Fluids: 1.3L      Blood Products: None   UOP:          PAST MEDICAL & SURGICAL HISTORY:  Prostate cancer      PVD (peripheral vascular disease)      Former smoker      H/O carotid stenosis      HTN (hypertension)      H/O prostatectomy        Home Meds: Home Medications:  aspirin 81 mg oral tablet: orally once a day (05 May 2023 06:49)  Crestor 5 mg oral tablet: 1 orally once a day (05 May 2023 06:49)  D3-50 1250 mcg (50,000 intl units) oral capsule: 1 orally once a week (05 May 2023 06:49)    Allergies: Allergies    No Known Drug Allergies  bee stings (Short breath)    Intolerances      Soc:   Advanced Directives: Presumed Full Code     ROS:    REVIEW OF SYSTEMS    [ ] A ten-point review of systems was otherwise negative except as noted.  [ ] Due to altered mental status/intubation, subjective information were not able to be obtained from the patient. History was obtained, to the extent possible, from review of the chart and collateral sources of information.      CURRENT MEDICATIONS:   --------------------------------------------------------------------------------------  Neurologic Medications  meperidine     Injectable 12.5 milliGRAM(s) IV Push every 10 minutes PRN Shivering  morphine  - Injectable 4 milliGRAM(s) IV Push every 10 minutes PRN Severe Pain (7 - 10)  morphine  - Injectable 2 milliGRAM(s) IV Push every 10 minutes PRN Moderate Pain (4 - 6)  ondansetron Injectable 4 milliGRAM(s) IV Push once PRN Nausea and/or Vomiting    Respiratory Medications    Cardiovascular Medications    Gastrointestinal Medications  lactated ringers. 1000 milliLiter(s) IV Continuous <Continuous>    Genitourinary Medications    Hematologic/Oncologic Medications    Antimicrobial/Immunologic Medications  ceFAZolin   IVPB 2000 milliGRAM(s) IV Intermittent every 8 hours    Endocrine/Metabolic Medications  atorvastatin 20 milliGRAM(s) Oral at bedtime    Topical/Other Medications  chlorhexidine 4% Liquid 1 Application(s) Topical <User Schedule>    --------------------------------------------------------------------------------------    VITAL SIGNS, INS/OUTS (last 24 hours):  --------------------------------------------------------------------------------------  ICU Vital Signs Last 24 Hrs  T(C): 36.3 (05 May 2023 11:40), Max: 36.6 (05 May 2023 06:38)  T(F): 97.4 (05 May 2023 11:40), Max: 97.8 (05 May 2023 06:38)  HR: 64 (05 May 2023 12:25) (63 - 71)  BP: 143/67 (05 May 2023 12:25) (130/64 - 172/84)  BP(mean): --  ABP: 150/52 (05 May 2023 12:25) (130/66 - 152/56)  ABP(mean): --  RR: 19 (05 May 2023 12:25) (17 - 19)  SpO2: 98% (05 May 2023 12:25) (97% - 99%)    O2 Parameters below as of 05 May 2023 12:25  Patient On (Oxygen Delivery Method): nasal cannula  O2 Flow (L/min): 3        I&O's Summary    --------------------------------------------------------------------------------------    EXAM:  General/Neuro  Exam: Normal, NAD, alert, oriented x 3, no focal deficits. PERRLA  . No neurological defitis.     Respiratory  Exam: Lungs clear to auscultation, Normal expansion/effort, satting well on 3L NC    Cardiovascular  Exam: S1, S2.  Regular rate and rhythm.    GI  Exam: Abdomen soft, Non-tender, Non-distended. No guarding, rebound or rigidity.   Current Diet: CLD      Tubes/Lines/Drains  ***  [x] Peripheral IV  [x] Arterial Line		[] R	[x] L	[] Fem	[x] Rad	[] Ax	Date Placed: 5/5/23      Extremities  Exam: Extremities warm, pink, well-perfused.    Derm:  Exam: Good skin turgor, no skin breakdown.        LABS  --------------------------------------------------------------------------------------  Labs:  CAPILLARY BLOOD GLUCOSE                              11.4   4.88  )-----------( 167      ( 05 May 2023 12:20 )             34.3       Auto Neutrophil %: 77.9 % (05-05-23 @ 12:20)  Auto Immature Granulocyte %: 0.2 % (05-05-23 @ 12:20)        --------------------------------------------------------------------------------------      IMAGING RESULTS    < from: Xray Chest 2 Views PA/Lat (04.24.23 @ 09:52) >    No radiographic evidence of acute cardiopulmonary disease.    Findings suggest COPD.    < end of copied text >   SICU Consultation Note  =====================================================  HPI: 77y Male with a PMH of HTN, prostate CA s/p prostatectomy, PVD who presented to [facility] for left carotid endarterectomy. The patient is a poor historian, however according to preoperative records the patient had reportedly c/o 2 years of progressive symptoms of short term memory loss, loss balance, dizziness while walking.     The patient is now s/p Left Carotid Endarterectomy, uncomplicated intraoperative course however elevated BP in the OR requiring esmolol gtt, nitroglycerin push, and Labetalol push 20mg last at 10:30AM.     SICU consulted for hemodynamic monitoring and blood pressure control.     Surgery Information  OR time:  2 hours 45 mins   EBL:    50cc      IV Fluids: 1.3L      Blood Products: None   UOP:  0cc        PAST MEDICAL & SURGICAL HISTORY:  Prostate cancer      PVD (peripheral vascular disease)      Former smoker      H/O carotid stenosis      HTN (hypertension)      H/O prostatectomy        Home Meds: Home Medications:  aspirin 81 mg oral tablet: orally once a day (05 May 2023 06:49)  Crestor 5 mg oral tablet: 1 orally once a day (05 May 2023 06:49)  D3-50 1250 mcg (50,000 intl units) oral capsule: 1 orally once a week (05 May 2023 06:49)    Allergies: Allergies    No Known Drug Allergies  bee stings (Short breath)    Intolerances      Soc:   Advanced Directives: Presumed Full Code     ROS:    REVIEW OF SYSTEMS    [ ] A ten-point review of systems was otherwise negative except as noted.  [ ] Due to altered mental status/intubation, subjective information were not able to be obtained from the patient. History was obtained, to the extent possible, from review of the chart and collateral sources of information.      CURRENT MEDICATIONS:   --------------------------------------------------------------------------------------  Neurologic Medications  meperidine     Injectable 12.5 milliGRAM(s) IV Push every 10 minutes PRN Shivering  morphine  - Injectable 4 milliGRAM(s) IV Push every 10 minutes PRN Severe Pain (7 - 10)  morphine  - Injectable 2 milliGRAM(s) IV Push every 10 minutes PRN Moderate Pain (4 - 6)  ondansetron Injectable 4 milliGRAM(s) IV Push once PRN Nausea and/or Vomiting    Respiratory Medications    Cardiovascular Medications    Gastrointestinal Medications  lactated ringers. 1000 milliLiter(s) IV Continuous <Continuous>    Genitourinary Medications    Hematologic/Oncologic Medications    Antimicrobial/Immunologic Medications  ceFAZolin   IVPB 2000 milliGRAM(s) IV Intermittent every 8 hours    Endocrine/Metabolic Medications  atorvastatin 20 milliGRAM(s) Oral at bedtime    Topical/Other Medications  chlorhexidine 4% Liquid 1 Application(s) Topical <User Schedule>    --------------------------------------------------------------------------------------    VITAL SIGNS, INS/OUTS (last 24 hours):  --------------------------------------------------------------------------------------  ICU Vital Signs Last 24 Hrs  T(C): 36.3 (05 May 2023 11:40), Max: 36.6 (05 May 2023 06:38)  T(F): 97.4 (05 May 2023 11:40), Max: 97.8 (05 May 2023 06:38)  HR: 64 (05 May 2023 12:25) (63 - 71)  BP: 143/67 (05 May 2023 12:25) (130/64 - 172/84)  BP(mean): --  ABP: 150/52 (05 May 2023 12:25) (130/66 - 152/56)  ABP(mean): --  RR: 19 (05 May 2023 12:25) (17 - 19)  SpO2: 98% (05 May 2023 12:25) (97% - 99%)    O2 Parameters below as of 05 May 2023 12:25  Patient On (Oxygen Delivery Method): nasal cannula  O2 Flow (L/min): 3        I&O's Summary    --------------------------------------------------------------------------------------    EXAM:  General/Neuro  Exam: Normal, NAD, alert, oriented x 3, no focal deficits. PERRLA  . No neurological defitis.     Respiratory  Exam: Lungs clear to auscultation, Normal expansion/effort, satting well on 3L NC    Cardiovascular  Exam: S1, S2.  Regular rate and rhythm.    GI  Exam: Abdomen soft, Non-tender, Non-distended. No guarding, rebound or rigidity.   Current Diet: CLD      Tubes/Lines/Drains   [x] Peripheral IV  [x] Arterial Line		[] R	[x] L	[] Fem	[x] Rad	[] Ax	Date Placed: 5/5/23      Extremities  Exam: Extremities warm, pink, well-perfused.    Derm:  Exam: Good skin turgor, no skin breakdown.  Incision site left neck clean, dry and intact      LABS  --------------------------------------------------------------------------------------  Labs:  CAPILLARY BLOOD GLUCOSE                              11.4   4.88  )-----------( 167      ( 05 May 2023 12:20 )             34.3       Auto Neutrophil %: 77.9 % (05-05-23 @ 12:20)  Auto Immature Granulocyte %: 0.2 % (05-05-23 @ 12:20)        --------------------------------------------------------------------------------------      IMAGING RESULTS    < from: Xray Chest 2 Views PA/Lat (04.24.23 @ 09:52) >    No radiographic evidence of acute cardiopulmonary disease.    Findings suggest COPD.    < end of copied text >

## 2023-05-05 NOTE — DISCHARGE NOTE PROVIDER - NSDCCPCAREPLAN_GEN_ALL_CORE_FT
PRINCIPAL DISCHARGE DIAGNOSIS  Diagnosis: Carotid stenosis, left  Assessment and Plan of Treatment: s/p thromboembolectomy and endarterectomy. Please, go to ED with any pain at the neck, swelling, difficulty swallowing, difficulty breathing, bleeding, oozing, numbness, weakness, syncope, fevers, chills. You may remove dressing next day. You may shower. Keep sterri strips on. Do not rub the site. Pat dry and keep open to air. Call your surgeon with any questions or concerns.

## 2023-05-05 NOTE — DISCHARGE NOTE PROVIDER - CARE PROVIDERS DIRECT ADDRESSES
,DirectAddress_Unknown,[email or web address] ,DirectAddress_Unknown,[email or web address]

## 2023-05-05 NOTE — ASU PREOP CHECKLIST - NS PREOP CHK MONITOR ANESTHESIA CONSENT
done [Lower Ext Edema] : lower extremity edema [Negative] : Heme/Lymph [Dyspnea on exertion] : not dyspnea during exertion [Shortness Of Breath] : no shortness of breath [Chest Pain] : no chest pain [Palpitations] : no palpitations

## 2023-05-05 NOTE — DISCHARGE NOTE PROVIDER - NSDCMRMEDTOKEN_GEN_ALL_CORE_FT
aspirin 81 mg oral tablet: orally once a day  Crestor 5 mg oral tablet: 1 orally once a day  D3-50 1250 mcg (50,000 intl units) oral capsule: 1 orally once a week

## 2023-05-05 NOTE — DISCHARGE NOTE PROVIDER - PROVIDER TOKENS
PROVIDER:[TOKEN:[[id number]],FOLLOWUP:[2 weeks]],PROVIDER:[TOKEN:[[id number]],FOLLOWUP:[1 week]] PROVIDER:[TOKEN:[[id number]],FOLLOWUP:[2 weeks]],PROVIDER:[TOKEN:[[id number]],FOLLOWUP:[1 week]],PROVIDER:[TOKEN:[[id number]]]

## 2023-05-05 NOTE — DISCHARGE NOTE PROVIDER - NSDCFUADDAPPT_GEN_ALL_CORE_FT
Please, make an appointment to see a cardiologist Dr. 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 Please, make an appointment to see a cardiologist Dr. 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    Please make appointment with Dr. Best at [facility] for medical management of HTN outpatient.

## 2023-05-05 NOTE — BRIEF OPERATIVE NOTE - OPERATION/FINDINGS
preop; left carotid stenosis   operation; left carotid endarterectomy with patch and eeg monitoring left carotid thromboembolectomy with patch and EEG monitoring.  Grossly, the L ICA lesion seemed like a missed embolus, which was partially organized, causing >90% luminal narrowing.  We will refer the patient to cardiology for FU (he is most likely a case of paroxysmal A fib?)

## 2023-05-05 NOTE — CONSULT NOTE ADULT - ASSESSMENT
Assessment & Plan    77y Male  s/p left carotid endarterectomy    NEURO:  #Acute pain    -APAP prn, Gabapentin 100mg q8  #2 years c/o loss of balance/dizziness/short term memory loss  -high grade L carotid stenosis s/p left CEA     RESP:   #Oxygenation    -wean off NC to RA as tolerated  #Activity    -increase as tolerated    CARDS:   #HTN  -not on any home meds  -SBP goal <160mmHg  -Intraoperative esmolol, nitro, and labetalol 20mg given  #PVD  -high grade L carotid stenosis s/p left CEA  #?paroxysmal Afib  -Patient to make an appointment cardiologist Dr. 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  -Malekpour to consider starting elliquis one week post-op  Imaging: EKG obtained 5/5/23-->sinus rhythm with 1st degree AV block, same as EKG from 4/24/23      GI/NUTR:   #Diet, Clear Liquid    -aspiration precautions, HOB 30  #GI Prophylaxis    -not indicated  #Bowel regimen    -senna/miralax      /RENAL:   #voiding independently, no strickland during case  -pending TOV  Monitor UO-  Labs:          BUN/Cr- 28/1.1  -->          Electrolytes-Na 137 // K 4.2 // Mg 1.7 //  Phos 3.6 (05-05 @ 12:20)         HEME/ONC:   #DVT prophylaxis    -SCDs  -Holding ASA 81mg home med until tomorrow 5/6      Labs: Hb/Hct:  11.4/34.3  -->                      Plts:  167  -->       ID:  #perioperative antibiotics  WBC- 4.88  --->>  Temp trend- 24hrs T(F): 97.4 (05-05 @ 11:40), Max: 97.8 (05-05 @ 06:38)  Antibiotics-ceFAZolin   IVPB 2000 every 8 hours        ENDO:      -Glucose goal 140-180. if above 180 start ISS    MSK:     Activity - Ambulate as tolerated, pending A-line removal    Additional Instructions:  Reason for Bedrest: post op (05-05-23 @ 11:59)        LINES/DRAINS:  PIV, A-line    ADVANCED DIRECTIVES:  Full Code    HCP/Emergency Contact-Erick, [phone] Janak    INDICATION FOR SICU: Occlusion and stenosis of carotid arteries of both sides and BP monitoring      DISPO:   Case discussed with attending Dr. Yan Santo Assessment & Plan    77y Male  s/p left carotid endarterectomy    NEURO:  #Acute pain    -APAP prn, Gabapentin 100mg q8  #2 years c/o loss of balance/dizziness/short term memory loss  -high grade L carotid stenosis s/p left CEA     RESP:   #Oxygenation    -wean off NC to RA as tolerated  #Activity    -increase as tolerated    CARDS:   #HTN  -not on any home meds  -SBP goal <160mmHg  -Intraoperative esmolol, nitro, and labetalol 20mg given  #PVD  #Carotid Stenosis  -high grade L carotid stenosis s/p left CEA  #?paroxysmal Afib  -Patient to make an appointment cardiologist Dr. 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  -Malekpour to consider starting elliquis one week post-op  Imaging: EKG obtained 5/5/23-->sinus rhythm with 1st degree AV block, same as EKG from 4/24/23      GI/NUTR:   #Diet, Clear Liquid    -aspiration precautions, HOB 30  #GI Prophylaxis    -not indicated  #Bowel regimen    -senna/miralax      /RENAL:   #voiding independently, no strickland during case  -pending TOV  -Monitor UO  #prostate CA s/p prostatectomy  Labs:          BUN/Cr- 28/1.1  -->          Electrolytes-Na 137 // K 4.2 // Mg 1.7 //  Phos 3.6 (05-05 @ 12:20)         HEME/ONC:   #DVT prophylaxis    -SCDs  -Holding ASA 81mg home med until tomorrow 5/6      Labs: Hb/Hct:  11.4/34.3  -->                      Plts:  167  -->       ID:  #perioperative antibiotics  WBC- 4.88  --->>  Temp trend- 24hrs T(F): 97.4 (05-05 @ 11:40), Max: 97.8 (05-05 @ 06:38)  Antibiotics-ceFAZolin   IVPB 2000 every 8 hours        ENDO:      -Glucose goal 140-180. if above 180 start ISS    MSK:     Activity - Ambulate as tolerated, pending A-line removal    Additional Instructions:  Reason for Bedrest: post op (05-05-23 @ 11:59)        LINES/DRAINS:  PIV, A-line    ADVANCED DIRECTIVES:  Full Code    HCP/Emergency Contact-Son, [phone] Janak    INDICATION FOR SICU: Occlusion and stenosis of carotid arteries of both sides and BP monitoring      DISPO:   Case discussed with attending Dr. Yan Santo Assessment & Plan    77y Male  s/p left carotid endarterectomy    NEURO:  #Acute pain    -APAP prn, Gabapentin 100mg q8  #2 years c/o loss of balance/dizziness/short term memory loss  -high grade L carotid stenosis s/p left CEA     RESP:   #Oxygenation    -wean off NC to RA as tolerated  #Activity    -increase as tolerated    CARDS:   #HTN  -not on any home meds  -SBP goal <160mmHg  -Intraoperative esmolol, nitro, and labetalol 20mg given  #PVD  -restarted statin, takes Crestor at home  #Carotid Stenosis  -high grade L carotid stenosis s/p left CEA  #?paroxysmal Afib  -Patient to make an appointment cardiologist Dr. 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  -Malekpour to consider starting elliquis one week post-op  Imaging: EKG obtained 5/5/23-->sinus rhythm with 1st degree AV block, same as EKG from 4/24/23      GI/NUTR:   #Diet, Clear Liquid    -aspiration precautions, HOB 30  #GI Prophylaxis    -not indicated  #Bowel regimen    -senna/miralax      /RENAL:   #voiding independently, no strickland during case  -pending TOV  -Monitor UO  #prostate CA s/p prostatectomy  Labs:          BUN/Cr- 28/1.1  -->          Electrolytes-Na 137 // K 4.2 // Mg 1.7 //  Phos 3.6 (05-05 @ 12:20)         HEME/ONC:   #DVT prophylaxis    -SCDs  -Holding ASA 81mg home med until tomorrow 5/6    Labs: Hb/Hct:  11.4/34.3  -->                      Plts:  167  -->       ID:  #perioperative antibiotics  WBC- 4.88  --->>  Temp trend- 24hrs T(F): 97.4 (05-05 @ 11:40), Max: 97.8 (05-05 @ 06:38)  Antibiotics-ceFAZolin   IVPB 2000 every 8 hours        ENDO:      -Glucose goal 140-180. if above 180 start ISS    MSK:     Activity - Ambulate as tolerated, pending A-line removal    Additional Instructions:  Reason for Bedrest: post op (05-05-23 @ 11:59)        LINES/DRAINS:  PIV, A-line    ADVANCED DIRECTIVES:  Full Code    HCP/Emergency Contact-Son, [phone] Janak    INDICATION FOR SICU: Occlusion and stenosis of carotid arteries of both sides and BP monitoring      DISPO:   Case discussed with attending Dr. Yan Santo

## 2023-05-05 NOTE — DISCHARGE NOTE PROVIDER - NSFOLLOWUPCLINICS_GEN_ALL_ED_FT
[facility]  Medicine  [address], [place]   Phone: [phone]  Fax:

## 2023-05-05 NOTE — DISCHARGE NOTE PROVIDER - CARE PROVIDER_API CALL
Ellen Wang)  Surgery; Vascular Surgery  [address] [place] [address]  Phone: [phone]  Fax: [phone]  Follow Up Time: 2 weeks    Romero Granda)  Cardiovascular Disease; Internal Medicine; Interventional Cardiology; Nuclear Cardiology  [address] [place] [address], [address]  [place] [address]  Phone: [phone]  Fax: [phone]  Follow Up Time: 1 week   Ellen Wang)  Surgery; Vascular Surgery  [address] [place] [address]  Phone: [phone]  Fax: [phone]  Follow Up Time: 2 weeks    Romero Granda)  Cardiovascular Disease; Internal Medicine; Interventional Cardiology; Nuclear Cardiology  [address] [facility], [address]  [place] [address]  Phone: [phone]  Fax: [phone]  Follow Up Time: 1 week    LIANA HIGGINBOTHAM  Internal Medicine  [address] [place] [address]  Phone: ([phone]  Fax: ()-  Follow Up Time:

## 2023-05-06 ENCOUNTER — TRANSCRIPTION ENCOUNTER (OUTPATIENT)
Age: 78
End: 2023-05-06

## 2023-05-06 VITALS
SYSTOLIC BLOOD PRESSURE: 104 MMHG | RESPIRATION RATE: 18 BRPM | TEMPERATURE: 97 F | OXYGEN SATURATION: 96 % | HEART RATE: 96 BPM | DIASTOLIC BLOOD PRESSURE: 62 MMHG

## 2023-05-06 RX ORDER — CHLORHEXIDINE GLUCONATE 213 G/1000ML
1 SOLUTION TOPICAL
Refills: 0 | Status: DISCONTINUED | OUTPATIENT
Start: 2023-05-06 | End: 2023-05-06

## 2023-05-06 RX ORDER — HEPARIN SODIUM 5000 [USP'U]/ML
5000 INJECTION INTRAVENOUS; SUBCUTANEOUS EVERY 8 HOURS
Refills: 0 | Status: DISCONTINUED | OUTPATIENT
Start: 2023-05-06 | End: 2023-05-06

## 2023-05-06 RX ORDER — ASPIRIN/CALCIUM CARB/MAGNESIUM 324 MG
81 TABLET ORAL DAILY
Refills: 0 | Status: DISCONTINUED | OUTPATIENT
Start: 2023-05-06 | End: 2023-05-06

## 2023-05-06 RX ADMIN — Medication 650 MILLIGRAM(S): at 00:55

## 2023-05-06 RX ADMIN — Medication 650 MILLIGRAM(S): at 11:38

## 2023-05-06 RX ADMIN — CHLORHEXIDINE GLUCONATE 1 APPLICATION(S): 213 SOLUTION TOPICAL at 06:28

## 2023-05-06 RX ADMIN — Medication 81 MILLIGRAM(S): at 11:39

## 2023-05-06 RX ADMIN — Medication 650 MILLIGRAM(S): at 05:14

## 2023-05-06 RX ADMIN — GABAPENTIN 100 MILLIGRAM(S): 400 CAPSULE ORAL at 05:14

## 2023-05-06 NOTE — PROGRESS NOTE ADULT - ASSESSMENT
ASSESSMENT/PLAN:  77y Male  s/p left carotid endarterectomy    NEURO:  #Acute pain    -APAP prn, Gabapentin 100mg q8  #2 years c/o loss of balance/dizziness/short term memory loss  -high grade L carotid stenosis s/p left CEA     RESP:   #Oxygenation    -wean off NC to RA as tolerated  #Activity    -increase as tolerated    CARDS:   #HTN  -not on any home meds  -SBP goal <160mmHg  -Intraoperative esmolol, nitro, and labetalol 20mg given  #PVD  -high grade L carotid stenosis s/p left CEA  #?paroxysmal Afib  -Patient to make an appointment cardiologist Dr. 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  -Malekpour to consider starting elliquis one week post-op  Imaging: EKG obtained 5/5/23-->sinus rhythm with 1st degree AV block, same as EKG from 4/24/23      GI/NUTR:   #Diet, Clear Liquid    -aspiration precautions, HOB 30  #GI Prophylaxis    -not indicated  #Bowel regimen    -senna/miralax      /RENAL:   #voiding independently, no strickland during case  -pending TOV  Monitor UO-  Labs:          BUN/Cr- 28/1.1  -->          Electrolytes-Na 137 // K 4.2 // Mg 1.7 //  Phos 3.6 (05-05 @ 12:20)         HEME/ONC:   #DVT prophylaxis    -SCDs  -Holding ASA 81mg home med until tomorrow 5/6      Labs: Hb/Hct:  11.4/34.3  -->                      Plts:  167  -->       ID:  #perioperative antibiotics  WBC- 4.88  --->>  Temp trend- 24hrs T(F): 97.4 (05-05 @ 11:40), Max: 97.8 (05-05 @ 06:38)  Antibiotics-ceFAZolin   IVPB 2000 every 8 hours        ENDO:      -Glucose goal 140-180. if above 180 start ISS    MSK:     Activity - Ambulate as tolerated, pending A-line removal    Additional Instructions:  Reason for Bedrest: post op (05-05-23 @ 11:59)        LINES/DRAINS:  PIV, A-line    ADVANCED DIRECTIVES:  Full Code    HCP/Emergency Contact-Erick, [phone] Janak ASSESSMENT/PLAN:  77y Male  s/p left carotid endarterectomy    NEURO:  #Acute pain    -APAP prn, Gabapentin 100mg q8  #2 years c/o loss of balance/dizziness/short term memory loss   -high grade L carotid stenosis s/p left CEA     RESP:   #Oxygenation    -wean off NC to RA as tolerated  #Activity    -increase as tolerated    CARDS:   #HTN    -not on any home meds    -SBP goal <160mmHg    -Intraoperative esmolol, nitro, and labetalol 20mg given    -Nicardipine gtt started 5/6   #PVD  #high grade L carotid stenosis s/p left CEA  #?paroxysmal Afib    -Patient to make an appointment cardiologist Dr. 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 Rubio to consider starting Eliquis one week post-op  Imaging: EKG obtained 5/5/23-->sinus rhythm with 1st degree AV block, same as EKG from 4/24/23    GI/NUTR:   #Diet, regular     -aspiration precautions, HOB 30  #GI Prophylaxis    -not indicated  #Bowel regimen    -senna/miralax    /RENAL:   #urine output in critically ill     -self voiding     Labs:          BUN/Cr- 28/1.1  -->,  21/0.9  -->          Electrolytes-Na 138 // K 4.3 // Mg 2.0 //  Phos 2.9 (05-05 @ 20:17)    HEME/ONC:   #DVT prophylaxis    -SCDs    -Holding ASA 81mg home med until tomorrow 5/6    Labs: Hb/Hct:  11.4/34.3  -->,  12.2/37.1  -->                      Plts:  167  -->,  166  -->                 PTT/INR:      T&S Expires:     ID:  WBC- 4.88  --->>,  4.68  --->>  Temp trend- 24hrs T(F): 97.7 (05-05 @ 20:00), Max: 97.8 (05-05 @ 06:38)  Antibiotics- Ancef x2 given postop   Cultures: None          ENDO:  -No active issues   -Glucose goal 140-180. if above 180 start ISS    MSK:  #Activity    - Ambulate as tolerated, pending A-line removal    LINES/DRAINS:  PIV, A-line    ADVANCED DIRECTIVES:  Full Code    HCP/Emergency Contact-Son, [phone] Janak    DISPO: SICU vs SDU vs Downgrade  ASSESSMENT/PLAN:  77y Male  s/p left carotid endarterectomy    NEURO:  #Acute pain    -APAP prn, Gabapentin 100mg q8  #2 years c/o loss of balance/dizziness/short term memory loss   -high grade L carotid stenosis s/p left CEA     RESP:   #Oxygenation    -wean off NC to RA as tolerated  #Activity    -increase as tolerated    CARDS:   #HTN    -not on any home meds    -SBP goal <160mmHg    -Intraoperative esmolol, nitro, and labetalol 20mg given    -Nicardipine gtt started 5/5 --> d/c 5/6   #PVD  #high grade L carotid stenosis s/p left CEA  #?paroxysmal Afib    -Patient to make an appointment cardiologist Dr. 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 Rubio to consider starting Eliquis one week post-op  Imaging: EKG obtained 5/5/23-->sinus rhythm with 1st degree AV block, same as EKG from 4/24/23    GI/NUTR:   #Diet, regular     -aspiration precautions, HOB 30  #GI Prophylaxis    -not indicated  #Bowel regimen    -senna/miralax    /RENAL:   #urine output in critically ill     -self voiding     Labs:          BUN/Cr- 28/1.1  -->,  21/0.9  -->          Electrolytes-Na 138 // K 4.3 // Mg 2.0 //  Phos 2.9 (05-05 @ 20:17)    HEME/ONC:   #DVT prophylaxis    -SCDs    -Holding ASA 81mg home med until tomorrow 5/6    Labs: Hb/Hct:  11.4/34.3  -->,  12.2/37.1  -->                      Plts:  167  -->,  166  -->                 PTT/INR:      T&S Expires:     ID:  WBC- 4.88  --->>,  4.68  --->>  Temp trend- 24hrs T(F): 97.7 (05-05 @ 20:00), Max: 97.8 (05-05 @ 06:38)  Antibiotics- Ancef x2 given postop   Cultures: None        ENDO:  -No active issues   -Glucose goal 140-180. if above 180 start ISS    MSK:  #Activity    - Ambulate as tolerated, pending A-line removal    LINES/DRAINS:  PIV, A-line    ADVANCED DIRECTIVES:  Full Code    HCP/Emergency Contact-Son, [phone] Janak    DISPO: SICU vs SDU vs Downgrade  ASSESSMENT/PLAN:  77y Male  s/p left carotid endarterectomy    NEURO:  #Acute pain    -APAP prn, Gabapentin 100mg q8  #2 years c/o loss of balance/dizziness/short term memory loss   -high grade L carotid stenosis s/p left CEA     RESP:   #Oxygenation    -wean off NC to RA as tolerated  #Activity    -increase as tolerated    CARDS:   #HTN    -not on any home meds   - may need BP med regimen prior to discharge    -SBP goal <160mmHg    -Intraoperative esmolol, nitro, and labetalol 20mg given    -Nicardipine gtt started 5/5 --> off since 2 am    #PVD  #high grade L carotid stenosis s/p left CEA  #?paroxysmal Afib    -Patient to make an appointment cardiologist Dr. 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 Rubio to consider starting Eliquis one week post-op  Imaging: EKG obtained 5/5/23-->sinus rhythm with 1st degree AV block, same as EKG from 4/24/23    GI/NUTR:   #Diet, regular     -aspiration precautions, HOB 30  #GI Prophylaxis    -not indicated  #Bowel regimen    -senna/miralax    /RENAL:   #urine output in critically ill     -self voiding - U/O 1600 x 24 hrs    Labs:          BUN/Cr- 28/1.1  -->,  21/0.9  -->          Electrolytes-Na 138 // K 4.3 // Mg 2.0 //  Phos 2.9 (05-05 @ 20:17)    HEME/ONC:   #DVT prophylaxis    -SCDs    -Holding ASA 81mg home med until tomorrow 5/6    Labs: Hb/Hct:  11.4/34.3  -->,  12.2/37.1  -->                      Plts:  167  -->,  166  -->                 PTT/INR:      T&S Expires:     ID:  WBC- 4.88  --->>,  4.68  --->>  Temp trend- 24hrs T(F): 97.7 (05-05 @ 20:00), Max: 97.8 (05-05 @ 06:38)  Antibiotics- Ancef x2 given postop   Cultures: None        ENDO:  -No active issues   -Glucose goal 140-180. if above 180 start ISS    MSK:  #Activity    - Ambulate as tolerated,     LINES/DRAINS:  PIV,    ADVANCED DIRECTIVES:  Full Code    HCP/Emergency Contact-Son, [phone] Janak    DISPO:  Downgrade to Floor- Vascular  Discussed with Dr. Santo ASSESSMENT/PLAN:  77y Male  s/p left carotid endarterectomy    NEURO:  #Acute pain    -APAP prn, Gabapentin 100mg q8  #2 years c/o loss of balance/dizziness/short term memory loss   -high grade L carotid stenosis s/p left CEA     RESP:   #Oxygenation    -wean off NC to RA as tolerated  #Activity    -increase as tolerated    CARDS:   #HTN    -not on any home meds   - may need BP med regimen prior to discharge    -SBP goal <160mmHg    -Intraoperative esmolol, nitro, and labetalol 20mg given    -Nicardipine gtt started 5/5 --> off since 2 am     - Outpatient f/u with Medicine for BP regimen  #PVD  #high grade L carotid stenosis s/p left CEA  #?paroxysmal Afib    -Patient to make an appointment cardiologist Dr. 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 Rubio to consider starting Eliquis one week post-op    Imaging: EKG obtained 5/5/23-->sinus rhythm with 1st degree AV block, same as EKG from 4/24/23    GI/NUTR:   #Diet, regular     -aspiration precautions, HOB 30  #GI Prophylaxis    -not indicated  #Bowel regimen    -senna/miralax    /RENAL:   #urine output in critically ill     -self voiding - U/O 1600 x 24 hrs    Labs:          BUN/Cr- 28/1.1  -->,  21/0.9  -->          Electrolytes-Na 138 // K 4.3 // Mg 2.0 //  Phos 2.9 (05-05 @ 20:17)    HEME/ONC:   #DVT prophylaxis    -SCDs    -Holding ASA 81mg home med until tomorrow 5/6    Labs: Hb/Hct:  11.4/34.3  -->,  12.2/37.1  -->                      Plts:  167  -->,  166  -->                 PTT/INR:      T&S Expires:     ID:  WBC- 4.88  --->>,  4.68  --->>  Temp trend- 24hrs T(F): 97.7 (05-05 @ 20:00), Max: 97.8 (05-05 @ 06:38)  Antibiotics- Ancef x2 given postop   Cultures: None        ENDO:  -No active issues   -Glucose goal 140-180. if above 180 start ISS    MSK:  #Activity    - Ambulate as tolerated,     LINES/DRAINS:  PIV,    ADVANCED DIRECTIVES:  Full Code    HCP/Emergency Contact-Son, [phone] Janak    DISPO:  Downgrade to Floor- Vascular vs Discharge  Discussed with Dr. Santo

## 2023-05-06 NOTE — PROGRESS NOTE ADULT - CRITICAL CARE ATTENDING COMMENT
23hr admit Critical Care: [id number]   This patient has a high probability of sudden, clinically significant deterioration, which requires the highest level of physician preparedness to intervene urgently. I managed/supervised life or organ supporting interventions that required frequent physician assessment. I have reviewed and agree with note above. I devoted my full attention to the direct care of this patient for the period of time indicated, including reviewing relevant labs and imaging, discussing treatment plan with the SICU team, nurses, and primary team at the time of multidisciplinary rounds, and reviewing findings with patient and family. This does not include time devoted to teaching any trainees and to performing any procedures.    GONSALO SMITH 77y Male admitted to SICU with s/p L CEA     Issues we are addressing today:    vascular checks per surgery  multimodal pain meds  optimize fluid status, not on antihtn meds at home, will need long term medicine f/u for htn meds  O2 to off  diet advance as tolerated  monitor Is &Os  d/c a-line  Stress Gastritis Prophylaxis   PT/OT when safe    safe for transfer out of ICU    The above note is NOT written at the time of rounds and will reflect all changes throughout management of the patient for the day note is written for.    Olvin Santo MD, D.SARAH.

## 2023-05-06 NOTE — CHART NOTE - NSCHARTNOTEFT_GEN_A_CORE
SICU DOWNGRADE NOTE    77y Male transferred to floor from SICU    SUBJECTIVE:  -------------------------------------------------------------------------------------------  PMHx/PSHx: PAST MEDICAL & SURGICAL HISTORY:  Prostate cancer  PVD (peripheral vascular disease)  Former smoker  H/O carotid stenosis  HTN (hypertension)  H/O prostatectomy    Allergies: No Known Drug Allergies  bee stings (Short breath)    -------------------------------------------------------------------------------------------    OBJECTIVE:  -------------------------------------------------------------------------------------------  Vitals:  T(C): 36.3 (05-06-23 @ 07:38), Max: 36.5 (05-05-23 @ 20:00)  HR: 102 (05-06-23 @ 09:00) (55 - 102)  BP: 133/72 (05-06-23 @ 09:00) (114/66 - 180/83)  RR: 18 (05-06-23 @ 09:00) (17 - 20)  SpO2: 94% (05-06-23 @ 09:00) (92% - 99%)  Wt(kg): --    -------------------------------------------------------------------------------------------  I&O's Summary    05 May 2023 07:01  -  06 May 2023 07:00  --------------------------------------------------------  IN: 270 mL / OUT: 1610 mL / NET: -1340 mL    06 May 2023 07:01  -  06 May 2023 10:54  --------------------------------------------------------  IN: 350 mL / OUT: 0 mL / NET: 350 mL      -------------------------------------------------------------------------------------------  Labs:                       12.2   4.68  )-----------( 166      ( 05 May 2023 20:17 )             37.1       Auto Neutrophil %: 77.9 % (05-05-23 @ 12:20)  Auto Immature Granulocyte %: 0.2 % (05-05-23 @ 12:20)    05-05    138  |  107  |  21<H>  ----------------------------<  113<H>  4.3   |  20  |  0.9      Calcium, Total Serum: 9.0 mg/dL (05-05-23 @ 20:17)      LFTs:     Lactate, Blood: 0.7 mmol/L (05-05-23 @ 12:20)      Coags:    CARDIAC MARKERS ( 05 May 2023 12:20 )  x     / <0.01 ng/mL / 59 U/L / x     / 1.6 ng/mL        -------------------------------------------------------------------------------------------  Active Medications:  MEDICATIONS  (STANDING):  acetaminophen     Tablet .. 650 milliGRAM(s) Oral every 6 hours  aspirin enteric coated 81 milliGRAM(s) Oral daily  atorvastatin 20 milliGRAM(s) Oral at bedtime  chlorhexidine 2% Cloths 1 Application(s) Topical <User Schedule>  gabapentin 100 milliGRAM(s) Oral three times a day  heparin   Injectable 5000 Unit(s) SubCutaneous every 8 hours  polyethylene glycol 3350 17 Gram(s) Oral daily  senna 1 Tablet(s) Oral at bedtime    MEDICATIONS  (PRN):      ASSESSMENT/PLAN:  77y Male with a PMH of HTN, prostate CA s/p prostatectomy, PVD and c/o 2 years of progressive symptoms of short term memory loss, loss balance, dizziness while walking,  presents to [facility] for an elective left carotid endarterectomy. Now POD # 1, uncomplicated intraoperative course however elevated BP in the OR requiring esmolol gtt, nitroglycerin push, and Labetalol push 20mg last at 10:30AM.     NEURO:  #Acute pain    -APAP prn, Gabapentin 100mg q8  #2 years c/o loss of balance/dizziness/short term memory loss   -high grade L carotid stenosis s/p left CEA     RESP:   #Oxygenation    -wean off NC to RA as tolerated    -incentive spirometry-   #Activity    -increase as tolerated    CARDS:   #HTN    -not on any home meds    -SBP goal <160mmHg    -Intraoperative esmolol, nitro, and labetalol 20mg given    -Nicardipine gtt started 5/6   #PVD  #high grade L carotid stenosis s/p left CEA  #?paroxysmal Afib    -Patient to make an appointment cardiologist Dr. 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 Rubio to consider starting Eliquis one week post-op  Imaging: EKG obtained 5/5/23-->sinus rhythm with 1st degree AV block, same as EKG from 4/24/23    GI/NUTR:   #Diet, regular     -aspiration precautions, HOB 30  #GI Prophylaxis    -not indicated  #Bowel regimen    -senna/miralax    /RENAL:   #urine output in critically ill     -self voiding     Labs:          BUN/Cr- 28/1.1  -->,  21/0.9  -->          Electrolytes-Na 138 // K 4.3 // Mg 2.0 //  Phos 2.9 (05-05 @ 20:17)    HEME/ONC:   #DVT prophylaxis    -SCDs    -Holding ASA 81mg home med until tomorrow 5/6    Labs: Hb/Hct:  11.4/34.3  -->,  12.2/37.1  -->                      Plts:  167  -->,  166  -->                 PTT/INR:      T&S Expires:     ID:  WBC- 4.88  --->>,  4.68  --->>  Temp trend- 24hrs T(F): 97.7 (05-05 @ 20:00), Max: 97.8 (05-05 @ 06:38)  Antibiotics- Ancef x2 given postop   Cultures: None          ENDO:  -No active issues   -Glucose goal 140-180. if above 180 start ISS    MSK:  #Activity    - Ambulate as tolerated    LINES/DRAINS:  PIV, A-line    ADVANCED DIRECTIVES:  Full Code    HCP/Emergency Contact-Erick, [phone] Janak    DISPO: Downgrade to floor- Vascular  Discussed with Dr. Santo    -------------------------------------------------------------------------------------------  SIGN OUT AT 05-06-23 @ 10:54 GIVEN TO:

## 2023-05-06 NOTE — PROGRESS NOTE ADULT - SUBJECTIVE AND OBJECTIVE BOX
GONSALO SMITH  77M    HPI: 77y Male with a PMH of HTN, prostate CA s/p prostatectomy, PVD who presented to [facility] for left carotid endarterectomy. The patient is a poor historian, however according to preoperative records the patient had reportedly c/o 2 years of progressive symptoms of short term memory loss, loss balance, dizziness while walking.     The patient is now s/p Left Carotid Endarterectomy, uncomplicated intraoperative course however elevated BP in the OR requiring esmolol gtt, nitroglycerin push, and Labetalol push 20mg last at 10:30AM.     SICU consulted for hemodynamic monitoring and blood pressure control.     Surgery Information  OR time:  2 hours 45 mins   EBL:    50cc      IV Fluids: 1.3L      Blood Products: None   UOP:  0cc      5/5  NIGHT  -PM rounds: no neurological deficitis; left neck incison w/ surrounding edema but no overlying skin changes; dressing c/d/i; Nicadipine gtt @ 5   -No overnight repletions     PAST MEDICAL & SURGICAL HISTORY:  Prostate cancer  PVD (peripheral vascular disease)  Former smoker  H/O carotid stenosis  HTN (hypertension)  H/O prostatectomy    Home Meds: Home Medications:  aspirin 81 mg oral tablet: orally once a day (05 May 2023 06:49)  Crestor 5 mg oral tablet: 1 orally once a day (05 May 2023 06:49)  D3-50 1250 mcg (50,000 intl units) oral capsule: 1 orally once a week (05 May 2023 06:49)    Allergies: Allergies  No Known Drug Allergies  bee stings (Short breath)    Intolerances    Soc:   Advanced Directives: Presumed Full Code     ROS:    REVIEW OF SYSTEMS  [ ] A ten-point review of systems was otherwise negative except as noted.  [ ] Due to altered mental status/intubation, subjective information were not able to be obtained from the patient. History was obtained, to the extent possible, from review of the chart and collateral sources of information.      ASSESSMENT/PLAN:  77y Male  s/p left carotid endarterectomy    NEURO:  #Acute pain    -APAP prn, Gabapentin 100mg q8  #2 years c/o loss of balance/dizziness/short term memory loss  -high grade L carotid stenosis s/p left CEA     RESP:   #Oxygenation    -wean off NC to RA as tolerated  #Activity    -increase as tolerated    CARDS:   #HTN  -not on any home meds  -SBP goal <160mmHg  -Intraoperative esmolol, nitro, and labetalol 20mg given  #PVD  -high grade L carotid stenosis s/p left CEA  #?paroxysmal Afib  -Patient to make an appointment cardiologist Dr. 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  -[place]kpour to consider starting elliquis one week post-op  Imaging: EKG obtained 5/5/23-->sinus rhythm with 1st degree AV block, same as EKG from 4/24/23      GI/NUTR:   #Diet, Clear Liquid    -aspiration precautions, HOB 30  #GI Prophylaxis    -not indicated  #Bowel regimen    -senna/miralax      /RENAL:   #voiding independently, no strickland during case  -pending TOV  Monitor UO-  Labs:          BUN/Cr- 28/1.1  -->          Electrolytes-Na 137 // K 4.2 // Mg 1.7 //  Phos 3.6 (05-05 @ 12:20)         HEME/ONC:   #DVT prophylaxis    -SCDs  -Holding ASA 81mg home med until tomorrow 5/6      Labs: Hb/Hct:  11.4/34.3  -->                      Plts:  167  -->       ID:  #perioperative antibiotics  WBC- 4.88  --->>  Temp trend- 24hrs T(F): 97.4 (05-05 @ 11:40), Max: 97.8 (05-05 @ 06:38)  Antibiotics-ceFAZolin   IVPB 2000 every 8 hours        ENDO:      -Glucose goal 140-180. if above 180 start ISS    MSK:     Activity - Ambulate as tolerated, pending A-line removal    Additional Instructions:  Reason for Bedrest: post op (05-05-23 @ 11:59)        LINES/DRAINS:  PIV, A-line    ADVANCED DIRECTIVES:  Full Code    HCP/Emergency Contact-Son, [phone] Janak   GONSALO SMITH  77M    HPI: 77y Male with a PMH of HTN, prostate CA s/p prostatectomy, PVD who presented to [facility] for left carotid endarterectomy. The patient is a poor historian, however according to preoperative records the patient had reportedly c/o 2 years of progressive symptoms of short term memory loss, loss balance, dizziness while walking.     The patient is now s/p Left Carotid Endarterectomy, uncomplicated intraoperative course however elevated BP in the OR requiring esmolol gtt, nitroglycerin push, and Labetalol push 20mg last at 10:30AM.     SICU consulted for hemodynamic monitoring and blood pressure control.     Surgery Information  OR time:  2 hours 45 mins   EBL:    50cc      IV Fluids: 1.3L      Blood Products: None   UOP:  0cc      24 hour events:  5/5  NIGHT  -PM rounds: no neurological deficitis; left neck incison w/ surrounding edema but no overlying skin changes; dressing c/d/i; Nicardipine gtt @ 5   -No overnight repletions   -Nicardipine gtt d/c @ 0200    DAY  -s/p left CEA, no neurological deficits, recieved IOP esmolol gtt, nitro,and labetalol 20mg for BP control  -home ASA to be restarted 5/6  -Mag repleted  -Plan to start oral anti-hypertensives per Vascular tomorrow 5/6  -Nicardipine gtt initiated  -Passed TOV, 400cc     PAST MEDICAL & SURGICAL HISTORY:  Prostate cancer  PVD (peripheral vascular disease)  Former smoker  H/O carotid stenosis  HTN (hypertension)  H/O prostatectomy    Home Meds: Home Medications:  aspirin 81 mg oral tablet: orally once a day (05 May 2023 06:49)  Crestor 5 mg oral tablet: 1 orally once a day (05 May 2023 06:49)  D3-50 1250 mcg (50,000 intl units) oral capsule: 1 orally once a week (05 May 2023 06:49)    Allergies: Allergies  No Known Drug Allergies  bee stings (Short breath)    Intolerances    Soc:   Advanced Directives: Presumed Full Code     ROS:    REVIEW OF SYSTEMS  [ ] A ten-point review of systems was otherwise negative except as noted.  [ ] Due to altered mental status/intubation, subjective information were not able to be obtained from the patient. History was obtained, to the extent possible, from review of the chart and collateral sources of information.   DOMINGAMARIANNEALPHONSEYAJAIRAAN  77M    HPI: 77y Male with a PMH of HTN, prostate CA s/p prostatectomy, PVD who presented to [facility] for left carotid endarterectomy. The patient is a poor historian, however according to preoperative records the patient had reportedly c/o 2 years of progressive symptoms of short term memory loss, loss balance, dizziness while walking.     The patient is now s/p Left Carotid Endarterectomy, uncomplicated intraoperative course however elevated BP in the OR requiring esmolol gtt, nitroglycerin push, and Labetalol push 20mg last at 10:30AM.     SICU consulted for hemodynamic monitoring and blood pressure control.     Surgery Information  OR time:  2 hours 45 mins   EBL:    50cc      IV Fluids: 1.3L      Blood Products: None   UOP:  0cc      24 hour events:  5/5  NIGHT  -PM rounds: no neurological deficitis; left neck incison w/ surrounding edema but no overlying skin changes; dressing c/d/i; Nicardipine gtt @ 5   -No overnight repletions   -Nicardipine gtt d/c @ 0200    DAY  -s/p left CEA, no neurological deficits, recieved IOP esmolol gtt, nitro,and labetalol 20mg for BP control  -home ASA to be restarted 5/6  -Mag repleted  -Plan to start oral anti-hypertensives per Vascular tomorrow 5/6  -Nicardipine gtt initiated  -Passed TOV, 400cc     PAST MEDICAL & SURGICAL HISTORY:  Prostate cancer  PVD (peripheral vascular disease)  Former smoker  H/O carotid stenosis  HTN (hypertension)  H/O prostatectomy    Home Meds: Home Medications:  aspirin 81 mg oral tablet: orally once a day (05 May 2023 06:49)  Crestor 5 mg oral tablet: 1 orally once a day (05 May 2023 06:49)  D3-50 1250 mcg (50,000 intl units) oral capsule: 1 orally once a week (05 May 2023 06:49)    Allergies: Allergies  No Known Drug Allergies  bee stings (Short breath)    Intolerances    Soc:   Advanced Directives: Presumed Full Code     Physical Exam:  Neuro- alert and oriented x 3  Resp- clear, no crackles, no wheezing  HEENT- left neck dressing in place, clean dry, no erythema, stable hematoma  Abd- soft, NT, ND, + bowel sounds  MSK- ROM x 4    ROS:    REVIEW OF SYSTEMS  [ ] A ten-point review of systems was otherwise negative except as noted.  [ ] Due to altered mental status/intubation, subjective information were not able to be obtained from the patient. History was obtained, to the extent possible, from review of the chart and collateral sources of information.

## 2023-05-06 NOTE — PROGRESS NOTE ADULT - ASSESSMENT
77y Male  s/p left carotid endarterectomy    Plan:   ok for q4h neuro checks   will need to be started on oral regimen for BP  Cards f/u with Dr Granda  start on ASA  diet  ambulating

## 2023-05-06 NOTE — DISCHARGE NOTE NURSING/CASE MANAGEMENT/SOCIAL WORK - PATIENT PORTAL LINK FT
You can access the FollowMyHealth Patient Portal offered by [facility] by registering at the following website: [email or web address]. By joining [employer]’s FollowMyHealth portal, you will also be able to view your health information using other applications (apps) compatible with our system.

## 2023-05-06 NOTE — DISCHARGE NOTE NURSING/CASE MANAGEMENT/SOCIAL WORK - NSDCPEFALRISK_GEN_ALL_CORE
For information on Fall & Injury Prevention, visit: [email or web address] OR  [email or web address] OR  https://www.cdc.gov/steadi/patient.html

## 2023-05-06 NOTE — DISCHARGE NOTE NURSING/CASE MANAGEMENT/SOCIAL WORK - NSDCFUADDAPPT_GEN_ALL_CORE_FT
Please, make an appointment to see a cardiologist Dr. 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    Please make appointment with Dr. Best at [facility] for medical management of HTN outpatient.

## 2023-05-06 NOTE — PROGRESS NOTE ADULT - SUBJECTIVE AND OBJECTIVE BOX
VASCULAR SURGERY PROGRESS NOTE    Patient: GONSALO SMITH , 77y ([birth year])Male   MRN: [id number]  Location: [address]  Visit: 05-05-23 Inpatient  Date: 05-06-23 @ 02:33    Hospital Day #: 2  Post-Op Day #: 1    Procedure/Dx/Injuries: left CEA    Events of past 24 hours: off nicardipine GTT, no neuro deficits, labs WNL    PAST MEDICAL & SURGICAL HISTORY:  Prostate cancer      PVD (peripheral vascular disease)      Former smoker      H/O carotid stenosis      HTN (hypertension)      H/O prostatectomy          Vitals:   T(F): 97.7 (05-05-23 @ 20:00), Max: 97.8 (05-05-23 @ 06:38)  HR: 95 (05-06-23 @ 02:00)  BP: 114/66 (05-06-23 @ 02:00)  RR: 20 (05-05-23 @ 21:00)  SpO2: 97% (05-06-23 @ 02:00)      Diet, DASH/TLC:   Sodium & Cholesterol Restricted  Diet, Regular  Diet, Clear Liquid      Fluids:     I & O's:    PHYSICAL EXAM:  General: NAD, AAOx3, calm and cooperative  HEENT: NCAT, WALKER, EOMI, Trachea ML, Neck supple, left neck incision clean and dry, slightly edematous no hematoma  Cardiac: RRR S1, S2, no Murmurs, rubs or gallops  Respiratory: CTAB, normal respiratory effort,  Abdomen: Soft, non-distended, non-tender,  Vascular: Pulses 2+ throughout, extremities well perfused  Skin: Warm/dry, normal color, no jaundice      MEDICATIONS  (STANDING):  acetaminophen     Tablet .. 650 milliGRAM(s) Oral every 6 hours  atorvastatin 20 milliGRAM(s) Oral at bedtime  chlorhexidine 4% Liquid 1 Application(s) Topical <User Schedule>  gabapentin 100 milliGRAM(s) Oral three times a day  niCARdipine Infusion 1 mG/Hr (5 mL/Hr) IV Continuous <Continuous>  polyethylene glycol 3350 17 Gram(s) Oral daily  senna 1 Tablet(s) Oral at bedtime    MEDICATIONS  (PRN):      DVT PROPHYLAXIS:   GI PROPHYLAXIS:   ANTICOAGULATION:   ANTIBIOTICS:            LAB/STUDIES:  Labs:  CAPILLARY BLOOD GLUCOSE                              12.2   4.68  )-----------( 166      ( 05 May 2023 20:17 )             37.1       Auto Neutrophil %: 77.9 % (05-05-23 @ 12:20)  Auto Immature Granulocyte %: 0.2 % (05-05-23 @ 12:20)    05-05    138  |  107  |  21<H>  ----------------------------<  113<H>  4.3   |  20  |  0.9      Calcium, Total Serum: 9.0 mg/dL (05-05-23 @ 20:17)      LFTs:     Lactate, Blood: 0.7 mmol/L (05-05-23 @ 12:20)      Coags:    CARDIAC MARKERS ( 05 May 2023 12:20 )  x     / <0.01 ng/mL / 59 U/L / x     / 1.6 ng/mL    IMAGING:      ACCESS/ DEVICES:  [X ] Peripheral IV  [ ] Central Venous Line	[ ] R	[ ] L	[ ] IJ	[ ] Fem	[ ] SC	Placed:   [X ] Arterial Line		[ ] R	[ ] L	[ ] Fem	[ ] Rad	[ ] Ax	Placed:   [ ] PICC:					[ ] Mediport  [ ] Urinary Catheter,  Date Placed:   [ ] Chest tube: [ ] Right, [ ] Left  [ ] MAGDI/Demian Drains

## 2023-05-10 DIAGNOSIS — Z79.899 OTHER LONG TERM (CURRENT) DRUG THERAPY: ICD-10-CM

## 2023-05-10 DIAGNOSIS — I48.0 PAROXYSMAL ATRIAL FIBRILLATION: ICD-10-CM

## 2023-05-10 DIAGNOSIS — Z79.82 LONG TERM (CURRENT) USE OF ASPIRIN: ICD-10-CM

## 2023-05-10 DIAGNOSIS — I73.9 PERIPHERAL VASCULAR DISEASE, UNSPECIFIED: ICD-10-CM

## 2023-05-10 DIAGNOSIS — I10 ESSENTIAL (PRIMARY) HYPERTENSION: ICD-10-CM

## 2023-05-10 DIAGNOSIS — I65.22 OCCLUSION AND STENOSIS OF LEFT CAROTID ARTERY: ICD-10-CM

## 2023-05-10 DIAGNOSIS — Z85.46 PERSONAL HISTORY OF MALIGNANT NEOPLASM OF PROSTATE: ICD-10-CM

## 2023-05-10 DIAGNOSIS — Z87.891 PERSONAL HISTORY OF NICOTINE DEPENDENCE: ICD-10-CM

## 2023-05-10 LAB — SURGICAL PATHOLOGY STUDY: SIGNIFICANT CHANGE UP

## 2023-05-15 PROBLEM — I10 ESSENTIAL (PRIMARY) HYPERTENSION: Chronic | Status: ACTIVE | Noted: 2023-04-24

## 2023-05-15 PROBLEM — I73.9 PERIPHERAL VASCULAR DISEASE, UNSPECIFIED: Chronic | Status: ACTIVE | Noted: 2023-04-24

## 2023-05-15 PROBLEM — Z86.79 PERSONAL HISTORY OF OTHER DISEASES OF THE CIRCULATORY SYSTEM: Chronic | Status: ACTIVE | Noted: 2023-04-24

## 2023-05-15 PROBLEM — C61 MALIGNANT NEOPLASM OF PROSTATE: Chronic | Status: ACTIVE | Noted: 2023-04-24

## 2023-05-15 PROBLEM — Z87.891 PERSONAL HISTORY OF NICOTINE DEPENDENCE: Chronic | Status: ACTIVE | Noted: 2023-04-24

## 2023-05-22 ENCOUNTER — APPOINTMENT (OUTPATIENT)
Dept: VASCULAR SURGERY | Facility: CLINIC | Age: 78
End: 2023-05-22
Payer: MEDICAID

## 2023-05-22 VITALS
WEIGHT: 170 LBS | BODY MASS INDEX: 27.32 KG/M2 | HEIGHT: 66 IN | DIASTOLIC BLOOD PRESSURE: 81 MMHG | SYSTOLIC BLOOD PRESSURE: 132 MMHG

## 2023-05-22 PROCEDURE — 99024 POSTOP FOLLOW-UP VISIT: CPT

## 2023-05-22 RX ORDER — ASPIRIN 81 MG
81 TABLET, DELAYED RELEASE (ENTERIC COATED) ORAL
Refills: 0 | Status: ACTIVE | COMMUNITY

## 2023-05-22 NOTE — ASSESSMENT
[FreeTextEntry1] : 76 y/o gentleman with high grade left carotid artery stenosis and underwent left CEA on 5/5/23. He is on Aspirin and Lipitor.\par \par The surgical neck incision is healing well. I will see him in 4 weeks for post operative carotid duplex.\par \par I will address his lower extremity symptoms once he is recovered from his carotid surgery.

## 2023-05-22 NOTE — HISTORY OF PRESENT ILLNESS
[FreeTextEntry1] : 78 y/o gentleman with high grade left carotid artery stenosis and underwent left CEA on 5/5/23. He is on Aspirin and Lipitor.\par \par He has h/o PVD and short distance claudication.

## 2023-05-30 ENCOUNTER — APPOINTMENT (OUTPATIENT)
Dept: CARDIOLOGY | Facility: CLINIC | Age: 78
End: 2023-05-30
Payer: MEDICAID

## 2023-05-30 VITALS
BODY MASS INDEX: 27.32 KG/M2 | WEIGHT: 170 LBS | HEIGHT: 66 IN | HEART RATE: 69 BPM | DIASTOLIC BLOOD PRESSURE: 80 MMHG | SYSTOLIC BLOOD PRESSURE: 136 MMHG

## 2023-05-30 DIAGNOSIS — I65.29 OCCLUSION AND STENOSIS OF UNSPECIFIED CAROTID ARTERY: ICD-10-CM

## 2023-05-30 DIAGNOSIS — Z87.891 PERSONAL HISTORY OF NICOTINE DEPENDENCE: ICD-10-CM

## 2023-05-30 DIAGNOSIS — Z78.9 OTHER SPECIFIED HEALTH STATUS: ICD-10-CM

## 2023-05-30 PROCEDURE — 93000 ELECTROCARDIOGRAM COMPLETE: CPT

## 2023-05-30 PROCEDURE — 99204 OFFICE O/P NEW MOD 45 MIN: CPT

## 2023-05-30 RX ORDER — ATORVASTATIN CALCIUM 80 MG/1
TABLET, FILM COATED ORAL
Refills: 0 | Status: DISCONTINUED | COMMUNITY
End: 2023-05-30

## 2023-05-30 NOTE — PHYSICAL EXAM
[No Acute Distress] : no acute distress [Normal Conjunctiva] : normal conjunctiva [Normal Venous Pressure] : normal venous pressure [No Carotid Bruit] : no carotid bruit [Normal S1, S2] : normal S1, S2 [No Murmur] : no murmur [No Rub] : no rub [No Gallop] : no gallop [Clear Lung Fields] : clear lung fields [Good Air Entry] : good air entry [No Respiratory Distress] : no respiratory distress  [Soft] : abdomen soft [Non Tender] : non-tender [No Masses/organomegaly] : no masses/organomegaly [Normal Bowel Sounds] : normal bowel sounds [Normal Gait] : normal gait [No Edema] : no edema [No Cyanosis] : no cyanosis [No Clubbing] : no clubbing [No Varicosities] : no varicosities [No Rash] : no rash [No Skin Lesions] : no skin lesions [Moves all extremities] : moves all extremities [No Focal Deficits] : no focal deficits [Normal Speech] : normal speech [Alert and Oriented] : alert and oriented [Normal memory] : normal memory

## 2023-05-30 NOTE — ASSESSMENT
[FreeTextEntry1] : Assessment:\par #HTN well controlled  \par #HL 03/01/23 Chol 185  Trig 269 \par #Arteriosclerosis of B carotid arteries  S/p L CEA on 05/05/23 \par \par Plan:\par -Exercise Stress Test to r/o CAD  \par -Start Rosuvastatin\par -Cont ASA\par -Pt is to keep B/P log and will call the office if B/p is elevated. \par -Low Chol Low Na diet \par -Activities as tolerated\par -F/u with Vascular to r/o PAD \par -F/u with PCP for labs monitoring\par -F/u after testing\par -F/u in 3 months \par \par att note\par pt seen and examined with NP\par agree with above\par

## 2023-05-30 NOTE — HISTORY OF PRESENT ILLNESS
[FreeTextEntry1] : 77y Male with a PMH of HTN, prostate CA, s/p prostatectomy, PVD,  HL, S/p L carotid endarterectomy.  \par Pt developed elevated B/p during Endarterectomy requiring NTG  and Labetalol push \par Pt denies chest pain, no dizziness, no SOB.  Pt reports normal B/p at home. \par Pt c.o BLE pain with walking 1/2 block \par hx of cigarette smoking x 30 years quit 3 years ago. \par Pt did not start taking Crestor yet \par 03/01/23 Chol 185  Trig 269 HgbA1c 5.4\par

## 2023-06-12 ENCOUNTER — APPOINTMENT (OUTPATIENT)
Dept: VASCULAR SURGERY | Facility: CLINIC | Age: 78
End: 2023-06-12
Payer: MEDICAID

## 2023-06-12 VITALS — DIASTOLIC BLOOD PRESSURE: 89 MMHG | SYSTOLIC BLOOD PRESSURE: 153 MMHG

## 2023-06-12 PROCEDURE — 99024 POSTOP FOLLOW-UP VISIT: CPT

## 2023-06-12 PROCEDURE — 93880 EXTRACRANIAL BILAT STUDY: CPT

## 2023-06-12 NOTE — END OF VISIT
[FreeTextEntry3] : 77 M s/p L CEA on 5/5/2023- wound is healed. US shows patent left ICA and moderate stenosis in R ICA.\par FU in 6 months for repeat carotid duplex.\par He is seeing cardiologist and undergoing work up.\par He will get a test only visit for LE arterial duplex (history of claudication)

## 2023-06-12 NOTE — DATA REVIEWED
[FreeTextEntry1] : I performed a carotid duplex which was medically necessary to evaluate for carotid stenosis. It showed 50-69% right carotid stenosis and widely patent left CEA site. \par

## 2023-06-12 NOTE — ASSESSMENT
[FreeTextEntry1] : 78 y/o gentleman with high grade left carotid artery stenosis and underwent left CEA on 5/5/23. He is on Aspirin and Lipitor. He has h/o PVD and short distance claudication. \par \par Carotid duplex showed 50-69% right carotid stenosis and widely patent left CEA site.\par \par Follow up in one month for LE arterial duplex to evaluate for PVD and six month follow up for carotid disease.

## 2023-07-10 ENCOUNTER — APPOINTMENT (OUTPATIENT)
Dept: VASCULAR SURGERY | Facility: CLINIC | Age: 78
End: 2023-07-10
Payer: MEDICAID

## 2023-07-10 PROCEDURE — 99024 POSTOP FOLLOW-UP VISIT: CPT

## 2023-07-10 PROCEDURE — 93925 LOWER EXTREMITY STUDY: CPT

## 2023-07-10 RX ORDER — RIVAROXABAN 15 MG-20MG
15 & 20 KIT ORAL
Qty: 1 | Refills: 2 | Status: ACTIVE | COMMUNITY
Start: 2023-07-10 | End: 1900-01-01

## 2023-07-10 NOTE — HISTORY OF PRESENT ILLNESS
[FreeTextEntry1] : 76 y/o gentleman with high grade left carotid artery stenosis and underwent left CEA on 5/5/23. He is on Aspirin and Lipitor.  At the time of the surgery the left carotid endarterectomy had thrombus more than plaque inside the artery.  Today the patient presents for evaluation of his lower extremities.  He complains of claudication symptoms at less than 1 block right worse than left leg.\par \par

## 2023-07-10 NOTE — ASSESSMENT
[FreeTextEntry1] : 76 y/o gentleman with high grade left carotid artery stenosis and underwent left CEA on 5/5/23. He is on Aspirin and Lipitor.\par The patient had what appeared to be thrombus in his left carotid endarterectomy site.  The patient also has what appears to be thrombus on duplex exam in his bilateral superficial femoral arteries.  I would like to start the patient on Xarelto as a precaution until he is seen and worked up by cardiology.  The patient is seeing cardiology on August 30. I am concerned for paroxysmal A-fib and the risk of further embolization.  If the patient's leg symptoms worsen and he obtains cardiac clearance I will consider intervention at that time.  I would like to see the patient back in my office in 2 months time or sooner if any new symptoms develop.

## 2023-07-10 NOTE — END OF VISIT
[FreeTextEntry3] : 77 M, s/p  L CEA, healed well. He is claudicant and has SFA occlusion in both thighs, with Doppler findings of mixed plaque and possible old thrombus. He will see cardiology for echo next month. I want to keep him on NOAC as long as aspirin and statin, due to gross findings in L ICA during endarterectomy and the arterial duplex findings today- he might be a case of paroxysmal arrhythmia?\par Will see him after cardiology visit. If needs angiogram, we will hold the Xarelto. \par

## 2023-07-10 NOTE — DATA REVIEWED
[FreeTextEntry1] : Arterial duplex right diffuse atherosclerotic plaque noted throughout the femoral-popliteal tibial arteries with high resistant flow amplitude flow in the proximal femoral superficial femoral artery absence of color flow Doppler signals are consistent with arterial occlusion of the superficial femoral artery from the distal segment with possible thrombus formation.  The popliteal artery is diffusely calcified with diminished flow.  Left diffuse atherosclerotic plaque noted throughout the femoral-popliteal tibial arteries consistent with arterial occlusion of the distal superficial femoral artery.  Popliteal artery is diffusely calcified with severely diminished flow

## 2023-08-31 ENCOUNTER — APPOINTMENT (OUTPATIENT)
Dept: CARDIOLOGY | Facility: CLINIC | Age: 78
End: 2023-08-31

## 2023-09-18 ENCOUNTER — APPOINTMENT (OUTPATIENT)
Dept: VASCULAR SURGERY | Facility: CLINIC | Age: 78
End: 2023-09-18
Payer: MEDICAID

## 2023-09-18 VITALS
WEIGHT: 167 LBS | SYSTOLIC BLOOD PRESSURE: 149 MMHG | HEIGHT: 66 IN | BODY MASS INDEX: 26.84 KG/M2 | DIASTOLIC BLOOD PRESSURE: 84 MMHG

## 2023-09-18 PROCEDURE — 93880 EXTRACRANIAL BILAT STUDY: CPT

## 2023-09-18 PROCEDURE — 99213 OFFICE O/P EST LOW 20 MIN: CPT

## 2023-09-28 ENCOUNTER — RX RENEWAL (OUTPATIENT)
Age: 78
End: 2023-09-28

## 2023-09-28 RX ORDER — ROSUVASTATIN CALCIUM 5 MG/1
5 TABLET, FILM COATED ORAL DAILY
Qty: 90 | Refills: 3 | Status: ACTIVE | COMMUNITY
Start: 2023-05-30 | End: 1900-01-01

## 2023-10-06 ENCOUNTER — APPOINTMENT (OUTPATIENT)
Dept: CARDIOLOGY | Facility: CLINIC | Age: 78
End: 2023-10-06
Payer: MEDICAID

## 2023-10-06 VITALS
HEIGHT: 66 IN | SYSTOLIC BLOOD PRESSURE: 132 MMHG | WEIGHT: 164 LBS | HEART RATE: 67 BPM | DIASTOLIC BLOOD PRESSURE: 76 MMHG | BODY MASS INDEX: 26.36 KG/M2

## 2023-10-06 DIAGNOSIS — I65.23 OCCLUSION AND STENOSIS OF BILATERAL CAROTID ARTERIES: ICD-10-CM

## 2023-10-06 PROCEDURE — 99214 OFFICE O/P EST MOD 30 MIN: CPT | Mod: 25

## 2023-10-06 PROCEDURE — 93000 ELECTROCARDIOGRAM COMPLETE: CPT

## 2023-10-12 ENCOUNTER — APPOINTMENT (OUTPATIENT)
Dept: CARDIOLOGY | Facility: CLINIC | Age: 78
End: 2023-10-12
Payer: MEDICAID

## 2023-10-12 ENCOUNTER — APPOINTMENT (OUTPATIENT)
Dept: CARDIOLOGY | Facility: CLINIC | Age: 78
End: 2023-10-12

## 2023-10-12 DIAGNOSIS — I70.213 ATHEROSCLEROSIS OF NATIVE ARTERIES OF EXTREMITIES WITH INTERMITTENT CLAUDICATION, BILATERAL LEGS: ICD-10-CM

## 2023-10-12 PROCEDURE — 93015 CV STRESS TEST SUPVJ I&R: CPT

## 2023-10-12 PROCEDURE — 93306 TTE W/DOPPLER COMPLETE: CPT

## 2023-10-12 PROCEDURE — ZZZZZ: CPT

## 2023-10-19 ENCOUNTER — OUTPATIENT (OUTPATIENT)
Dept: OUTPATIENT SERVICES | Facility: HOSPITAL | Age: 78
LOS: 1 days | End: 2023-10-19
Payer: MEDICAID

## 2023-10-19 ENCOUNTER — RESULT REVIEW (OUTPATIENT)
Age: 78
End: 2023-10-19

## 2023-10-19 DIAGNOSIS — Z90.79 ACQUIRED ABSENCE OF OTHER GENITAL ORGAN(S): Chronic | ICD-10-CM

## 2023-10-19 DIAGNOSIS — Z00.8 ENCOUNTER FOR OTHER GENERAL EXAMINATION: ICD-10-CM

## 2023-10-19 DIAGNOSIS — I65.23 OCCLUSION AND STENOSIS OF BILATERAL CAROTID ARTERIES: ICD-10-CM

## 2023-10-19 PROCEDURE — 93018 CV STRESS TEST I&R ONLY: CPT

## 2023-10-19 PROCEDURE — A9500: CPT

## 2023-10-19 PROCEDURE — 78452 HT MUSCLE IMAGE SPECT MULT: CPT

## 2023-10-19 PROCEDURE — 78452 HT MUSCLE IMAGE SPECT MULT: CPT | Mod: 26

## 2023-10-20 DIAGNOSIS — I65.23 OCCLUSION AND STENOSIS OF BILATERAL CAROTID ARTERIES: ICD-10-CM

## 2024-01-12 ENCOUNTER — APPOINTMENT (OUTPATIENT)
Dept: CARDIOLOGY | Facility: CLINIC | Age: 79
End: 2024-01-12
Payer: MEDICAID

## 2024-01-12 VITALS
SYSTOLIC BLOOD PRESSURE: 138 MMHG | BODY MASS INDEX: 26.95 KG/M2 | HEART RATE: 67 BPM | WEIGHT: 167 LBS | DIASTOLIC BLOOD PRESSURE: 64 MMHG

## 2024-01-12 DIAGNOSIS — I10 ESSENTIAL (PRIMARY) HYPERTENSION: ICD-10-CM

## 2024-01-12 DIAGNOSIS — E78.5 HYPERLIPIDEMIA, UNSPECIFIED: ICD-10-CM

## 2024-01-12 DIAGNOSIS — R07.89 OTHER CHEST PAIN: ICD-10-CM

## 2024-01-12 DIAGNOSIS — I65.23 OCCLUSION AND STENOSIS OF BILATERAL CAROTID ARTERIES: ICD-10-CM

## 2024-01-12 PROCEDURE — 93000 ELECTROCARDIOGRAM COMPLETE: CPT

## 2024-01-12 PROCEDURE — 99214 OFFICE O/P EST MOD 30 MIN: CPT | Mod: 25

## 2024-01-12 NOTE — REASON FOR VISIT
[CV Risk Factors and Non-Cardiac Disease] : CV risk factors and non-cardiac disease [Hyperlipidemia] : hyperlipidemia [Hypertension] : hypertension [Carotid, Aortic and Peripheral Vascular Disease] : carotid, aortic and peripheral vascular disease [Other: _____] : [unfilled]

## 2024-01-14 NOTE — HISTORY OF PRESENT ILLNESS
[FreeTextEntry1] : 79 yo Male with a PMH of HTN, prostate CA, s/p prostatectomy, PVD,  HL, S/p L carotid endarterectomy.   ON and off exertional cp, s/p stress mpi on and off dizziness, no SOB.  Pt reports good B/p at home.  hx of cigarette smoking x 30 years quit 3 years ago.

## 2024-01-14 NOTE — ASSESSMENT
[FreeTextEntry1] : #HTN #HL #CP/PVD  Plan: -Stress mpi - low risk for cad, med rx -Cont low dose ARB, monitor BP -Cont ASA/Statin -Low Chol Low Na diet -Activities as tolerated -F/u with Vascular -F/u with PCP for labs monitoring -F/u in 6 months.

## 2024-03-31 ENCOUNTER — RX RENEWAL (OUTPATIENT)
Age: 79
End: 2024-03-31

## 2024-03-31 RX ORDER — VALSARTAN 40 MG/1
40 TABLET, COATED ORAL DAILY
Qty: 30 | Refills: 4 | Status: ACTIVE | COMMUNITY
Start: 2023-10-06 | End: 1900-01-01